# Patient Record
Sex: FEMALE | Race: WHITE | NOT HISPANIC OR LATINO | Employment: OTHER | URBAN - NONMETROPOLITAN AREA
[De-identification: names, ages, dates, MRNs, and addresses within clinical notes are randomized per-mention and may not be internally consistent; named-entity substitution may affect disease eponyms.]

---

## 2017-03-09 ENCOUNTER — OFFICE VISIT (OUTPATIENT)
Dept: INTERNAL MEDICINE | Facility: CLINIC | Age: 52
End: 2017-03-09

## 2017-03-09 VITALS
HEART RATE: 86 BPM | OXYGEN SATURATION: 98 % | DIASTOLIC BLOOD PRESSURE: 60 MMHG | BODY MASS INDEX: 21.24 KG/M2 | WEIGHT: 132.19 LBS | RESPIRATION RATE: 16 BRPM | HEIGHT: 66 IN | TEMPERATURE: 98.4 F | SYSTOLIC BLOOD PRESSURE: 100 MMHG

## 2017-03-09 DIAGNOSIS — J30.9 ALLERGIC RHINITIS, UNSPECIFIED ALLERGIC RHINITIS TRIGGER, UNSPECIFIED RHINITIS SEASONALITY: Primary | ICD-10-CM

## 2017-03-09 LAB
EXPIRATION DATE: NORMAL
FLUAV AG NPH QL: NORMAL
FLUBV AG NPH QL: NORMAL
INTERNAL CONTROL: NORMAL
Lab: NORMAL

## 2017-03-09 PROCEDURE — 99213 OFFICE O/P EST LOW 20 MIN: CPT | Performed by: NURSE PRACTITIONER

## 2017-03-09 PROCEDURE — 87804 INFLUENZA ASSAY W/OPTIC: CPT | Performed by: NURSE PRACTITIONER

## 2017-03-09 RX ORDER — IBUPROFEN 200 MG
200 TABLET ORAL EVERY 6 HOURS PRN
COMMUNITY
End: 2017-05-31

## 2017-03-09 RX ORDER — FLUTICASONE PROPIONATE 50 MCG
2 SPRAY, SUSPENSION (ML) NASAL DAILY
COMMUNITY
End: 2017-06-13

## 2017-03-09 RX ORDER — IVERMECTIN 10 MG/G
1 CREAM TOPICAL TAKE AS DIRECTED
COMMUNITY
Start: 2017-01-18 | End: 2019-05-24

## 2017-03-09 NOTE — PROGRESS NOTES
Chief Complaint / Reason:      Chief Complaint   Patient presents with   • Nasal Congestion     and runny nose back and forth. ? fever. Thinks she may have had an allergy attack. Onset of sx  night.    • Cough       Subjective   Patient states that has runny nose and cough. 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 She has environmental allergies and has been taking Aleve sinus/headache.  She appears in no distress but does have a clear runny nose. She is getting ready to go to [place] to visit friends.  Cough   This is a new problem. The current episode started in the past 7 days. The problem has been unchanged. The cough is non-productive. Associated symptoms include chest pain, chills, ear congestion, eye redness, nasal congestion, postnasal drip, rhinorrhea, a sore throat and sweats. The symptoms are aggravated by pollens, dust and lying down. She has tried body position changes for the symptoms. The treatment provided no relief. Her past medical history is significant for environmental allergies.       History taken from: patient    PMH/FH/Social History were reviewed and updated appropriately in the electronic medical record.     Review of Systems:   Review of Systems   Constitutional: Positive for chills.   HENT: Positive for postnasal drip, rhinorrhea, sneezing, sore throat and voice change.    Eyes: Positive for redness.   Respiratory: Positive for cough.    Cardiovascular: Positive for chest pain.   Gastrointestinal: Negative.    Musculoskeletal: Negative.    Allergic/Immunologic: Positive for environmental allergies.     All other systems were reviewed and are negative.  Exceptions are noted in the subjective or above.      Objective     Vital Signs  Temp:  [98.4 °F (36.9 °C)] 98.4 °F (36.9 °C)  Heart Rate:  [86] 86  Resp:  [16] 16  BP: (100)/(60) 100/60  Body mass index is  21.34 kg/(m^2).    Physical Exam   Constitutional: She is oriented to person, place, and time. She appears well-developed and well-nourished.   HENT:   Head: Normocephalic and atraumatic.       Mouth/Throat: Mucous membranes are dry. Posterior oropharyngeal erythema present.   Eyes: Lids are normal. Pupils are equal, round, and reactive to light. Right eye exhibits no discharge. Left eye exhibits no discharge.   Clear watery erythematous eyes noted.   Cardiovascular: Normal rate, regular rhythm, normal heart sounds and intact distal pulses.    Pulmonary/Chest: Effort normal and breath sounds normal. She exhibits tenderness.   Abdominal: Soft. Bowel sounds are normal.   Lymphadenopathy:     She has cervical adenopathy.   Neurological: She is alert and oriented to person, place, and time.   Skin: Skin is warm and dry.   Vitals reviewed.       Results Review:    I reviewed the patient's new clinical results. Flu -negative      Medication Review:     Current Outpatient Prescriptions:   •  aspirin 81 MG EC tablet, Take 81 mg by mouth Daily., Disp: , Rfl:   •  cholestyramine light (PREVALITE) 4 G packet, Take 1 packet by mouth Daily., Disp: 30 packet, Rfl: 5  •  fluticasone (FLONASE) 50 MCG/ACT nasal spray, 2 sprays into each nostril Daily., Disp: , Rfl:   •  ibuprofen (ADVIL,MOTRIN) 200 MG tablet, Take 200 mg by mouth Every 6 (Six) Hours As Needed for Mild Pain (1-3)., Disp: , Rfl:   •  loratadine-pseudoephedrine (CLARITIN-D 12 HOUR) 5-120 MG per 12 hr tablet, Take 1 tablet by mouth daily., Disp: , Rfl:   •  Multiple Vitamins-Minerals (MULTIVITAMIN ADULT PO), Take 1 tablet by mouth daily., Disp: , Rfl:   •  PROAIR  (90 BASE) MCG/ACT inhaler, , Disp: , Rfl:   •  Pseudoephedrine-Naproxen Na (ALEVE-D SINUS & HEADACHE PO), Take  by mouth., Disp: , Rfl:   •  SOOLANTRA 1 % cream, , Disp: , Rfl:     Assessment/Plan   Jeni was seen today for nasal congestion and cough.    Diagnoses and all orders for this  visit:    Allergic rhinitis, unspecified allergic rhinitis trigger, unspecified rhinitis seasonality    Treat symptomatically with increasing fluids, salt water gargles, Claritin, and Motrin.   Encourage good oral hygiene.   Do not recommend taking aspirin, motrin, and aleve sinus together due to previous colon surgery.   Wear Sunblock and hat when out in the sun.   Follow up as needed or if symptoms worsen.        Liya Bailey, APRN  03/09/17    *. Please note that portions of this note were completed with a voice recognition program. Efforts were made to edit the dictations, but occasionally words are mistranscribed.

## 2017-03-13 ENCOUNTER — TELEPHONE (OUTPATIENT)
Dept: INTERNAL MEDICINE | Facility: CLINIC | Age: 52
End: 2017-03-13

## 2017-03-13 RX ORDER — AMOXICILLIN AND CLAVULANATE POTASSIUM 875; 125 MG/1; MG/1
1 TABLET, FILM COATED ORAL 2 TIMES DAILY
Qty: 14 TABLET | Refills: 0 | Status: SHIPPED | OUTPATIENT
Start: 2017-03-13 | End: 2017-03-20

## 2017-03-13 NOTE — TELEPHONE ENCOUNTER
----- Message from Chelsea Landin sent at 3/13/2017  9:59 AM EDT -----  Contact: PATIENT  Patient called today stating that she saw Liya last Thursday and she was dx with allergic rhinitis, but was told if it developed any farther to call. Patient states that now it has transitioned to a sinus infection and needs something called in for this. States she will be flying on Wed and needs to start clearing it up before then. Patient can be reached at [phone]. Patient would like to know when this has been called in.    [facility]  [address], [place]   Ph. [phone]

## 2017-03-13 NOTE — TELEPHONE ENCOUNTER
p please let patient know that I tried calling her to discuss medications but that I could orders in for that [facility] she provided for us.  Tell her how she feels better enjoy her vacation thank

## 2017-05-31 ENCOUNTER — OFFICE VISIT (OUTPATIENT)
Dept: INTERNAL MEDICINE | Facility: CLINIC | Age: 52
End: 2017-05-31

## 2017-05-31 VITALS
WEIGHT: 135.13 LBS | TEMPERATURE: 98 F | DIASTOLIC BLOOD PRESSURE: 70 MMHG | HEART RATE: 77 BPM | OXYGEN SATURATION: 96 % | SYSTOLIC BLOOD PRESSURE: 115 MMHG | BODY MASS INDEX: 21.72 KG/M2 | HEIGHT: 66 IN

## 2017-05-31 DIAGNOSIS — K90.9 DIARRHEA DUE TO MALABSORPTION: Primary | ICD-10-CM

## 2017-05-31 DIAGNOSIS — R10.9 RIGHT FLANK PAIN: ICD-10-CM

## 2017-05-31 DIAGNOSIS — J30.1 SEASONAL ALLERGIC RHINITIS DUE TO POLLEN: ICD-10-CM

## 2017-05-31 DIAGNOSIS — R19.7 DIARRHEA DUE TO MALABSORPTION: Primary | ICD-10-CM

## 2017-05-31 LAB
ALBUMIN SERPL-MCNC: 4.4 G/DL (ref 3.5–5)
ALBUMIN/GLOB SERPL: 1.6 G/DL (ref 1–2)
ALP SERPL-CCNC: 79 U/L (ref 38–126)
ALT SERPL-CCNC: 29 U/L (ref 13–69)
AST SERPL-CCNC: 31 U/L (ref 15–46)
BILIRUB BLD-MCNC: NEGATIVE MG/DL
BILIRUB SERPL-MCNC: 0.5 MG/DL (ref 0.2–1.3)
BUN SERPL-MCNC: 16 MG/DL (ref 7–20)
BUN/CREAT SERPL: 22.9 (ref 7.1–23.5)
CALCIUM SERPL-MCNC: 9.6 MG/DL (ref 8.4–10.2)
CHLORIDE SERPL-SCNC: 102 MMOL/L (ref 98–107)
CLARITY, POC: CLEAR
CO2 SERPL-SCNC: 28 MMOL/L (ref 26–30)
COLOR UR: YELLOW
CREAT SERPL-MCNC: 0.7 MG/DL (ref 0.6–1.3)
ERYTHROCYTE [DISTWIDTH] IN BLOOD BY AUTOMATED COUNT: 13.2 % (ref 11.5–14.5)
GLOBULIN SER CALC-MCNC: 2.8 GM/DL
GLUCOSE SERPL-MCNC: 90 MG/DL (ref 74–98)
GLUCOSE UR STRIP-MCNC: NEGATIVE MG/DL
HCT VFR BLD AUTO: 40.9 % (ref 37–47)
HGB BLD-MCNC: 13 G/DL (ref 12–16)
KETONES UR QL: NEGATIVE
LEUKOCYTE EST, POC: NEGATIVE
MCH RBC QN AUTO: 29.8 PG (ref 27–31)
MCHC RBC AUTO-ENTMCNC: 31.8 G/DL (ref 30–37)
MCV RBC AUTO: 93.8 FL (ref 81–99)
NITRITE UR-MCNC: NEGATIVE MG/ML
PH UR: 5 [PH] (ref 5–8)
PLATELET # BLD AUTO: 164 10*3/MM3 (ref 130–400)
POTASSIUM SERPL-SCNC: 4.5 MMOL/L (ref 3.5–5.1)
PROT SERPL-MCNC: 7.2 G/DL (ref 6.3–8.2)
PROT UR STRIP-MCNC: NEGATIVE MG/DL
RBC # BLD AUTO: 4.36 10*6/MM3 (ref 4.2–5.4)
RBC # UR STRIP: ABNORMAL /UL
SODIUM SERPL-SCNC: 142 MMOL/L (ref 137–145)
SP GR UR: 1.02 (ref 1–1.03)
UROBILINOGEN UR QL: NORMAL
WBC # BLD AUTO: 5.14 10*3/MM3 (ref 4.8–10.8)

## 2017-05-31 PROCEDURE — 81003 URINALYSIS AUTO W/O SCOPE: CPT | Performed by: INTERNAL MEDICINE

## 2017-05-31 PROCEDURE — 99214 OFFICE O/P EST MOD 30 MIN: CPT | Performed by: INTERNAL MEDICINE

## 2017-06-06 ENCOUNTER — HOSPITAL ENCOUNTER (OUTPATIENT)
Dept: CT IMAGING | Facility: HOSPITAL | Age: 52
Discharge: HOME | End: 2017-06-06
Attending: INTERNAL MEDICINE | Admitting: INTERNAL MEDICINE

## 2017-06-06 ENCOUNTER — TRANSCRIBE ORDERS (OUTPATIENT)
Dept: MAMMOGRAPHY | Facility: HOSPITAL | Age: 52
End: 2017-06-06

## 2017-06-06 DIAGNOSIS — R10.9 RIGHT FLANK PAIN: ICD-10-CM

## 2017-06-06 DIAGNOSIS — Z12.31 VISIT FOR SCREENING MAMMOGRAM: Primary | ICD-10-CM

## 2017-06-06 DIAGNOSIS — K90.9 DIARRHEA DUE TO MALABSORPTION: ICD-10-CM

## 2017-06-06 DIAGNOSIS — R19.7 DIARRHEA DUE TO MALABSORPTION: ICD-10-CM

## 2017-06-06 PROCEDURE — 74177 CT ABD & PELVIS W/CONTRAST: CPT

## 2017-06-06 PROCEDURE — 0 IOPAMIDOL 61 % SOLUTION: Performed by: INTERNAL MEDICINE

## 2017-06-06 RX ADMIN — BARIUM SULFATE 450 ML: 21 SUSPENSION ORAL at 08:10

## 2017-06-06 RX ADMIN — IOPAMIDOL 95 ML: 612 INJECTION, SOLUTION INTRAVENOUS at 09:30

## 2017-06-07 DIAGNOSIS — R10.11 RIGHT UPPER QUADRANT ABDOMINAL PAIN: Primary | ICD-10-CM

## 2017-06-13 ENCOUNTER — OFFICE VISIT (OUTPATIENT)
Dept: SURGERY | Facility: CLINIC | Age: 52
End: 2017-06-13

## 2017-06-13 VITALS
OXYGEN SATURATION: 99 % | HEIGHT: 66 IN | TEMPERATURE: 97.9 F | BODY MASS INDEX: 22.21 KG/M2 | HEART RATE: 81 BPM | RESPIRATION RATE: 16 BRPM | SYSTOLIC BLOOD PRESSURE: 94 MMHG | DIASTOLIC BLOOD PRESSURE: 62 MMHG | WEIGHT: 138.2 LBS

## 2017-06-13 DIAGNOSIS — R10.11 RIGHT UPPER QUADRANT ABDOMINAL PAIN: Primary | ICD-10-CM

## 2017-06-13 PROCEDURE — 99204 OFFICE O/P NEW MOD 45 MIN: CPT | Performed by: SURGERY

## 2017-06-13 RX ORDER — MONTELUKAST SODIUM 4 MG/1
1 TABLET, CHEWABLE ORAL 2 TIMES DAILY
Qty: 60 TABLET | Refills: 1 | Status: SHIPPED | OUTPATIENT
Start: 2017-06-13 | End: 2017-07-13

## 2017-06-13 NOTE — PROGRESS NOTES
Subjective   Jeni Newby is a 52 y.o. female.   Chief Complaint   Patient presents with   • Abdominal Pain     RUQ pain        History of Present Illness   Ms Newby is a 52-year-old female patient referred for further evaluation of persistent right-sided abdominal pain following a robotic low anterior resection performed for unresectable villous adenoma of the left colon in September 2016.  This was performed by Dr. Melendez of [facility].  She reports chronic right-sided abdominal pain which has never gone away since her operative procedure.  She also reports right flank pain.  She has had associated constant diarrhea since that time and cholestyramine was recently added.  She admits that she has not been able to tolerate this as prescribed and has not been consistent in taking it.  She reports that her pain is exacerbated by touch and by lying on the left side.  She cannot identify any alleviating factors.  She was seen in follow up by Dr. Melendez and Dr. Huitron shortly after her procedure and reports that they did not have an explanation for her pain.  A recent CT was unrevealing as to the source of her pain.      Past Medical History:   Diagnosis Date   • Asthma     allergy induced   • Bronchitis    • Cancer     basal cell carcinoma   • Wears eyeglasses        Past Surgical History:   Procedure Laterality Date   • COLON RESECTION N/A 9/7/2016    Procedure: LOW ANTERIOR COLON RESECTION LAPAROSCOPIC  WITH DAVINCI SI ROBOT VS EXTENDED LEFT COLECTOMY WITH TAP BLOCK;  Surgeon: Isra Melendez MD;  Location:  [facility];  Service:    • COLONOSCOPY      2016   • PROCTOSCOPY N/A 9/7/2016    Procedure: PROCTOSCOPY RIGID;  Surgeon: Isra Melendez MD;  Location:  [facility];  Service:    • SKIN CANCER EXCISION     • WISDOM TOOTH EXTRACTION             Current Outpatient Prescriptions:   •  aspirin 81 MG EC tablet, Take 81 mg by mouth Daily., Disp: , Rfl:   •  loratadine-pseudoephedrine (CLARITIN-D 12 HOUR) 5-120 MG per 12 hr  tablet, Take 1 tablet by mouth daily., Disp: , Rfl:   •  Multiple Vitamins-Minerals (MULTIVITAMIN ADULT PO), Take 1 tablet by mouth daily., Disp: , Rfl:   •  PROAIR  (90 BASE) MCG/ACT inhaler, , Disp: , Rfl:   •  SOOLANTRA 1 % cream, , Disp: , Rfl:   •  TURMERIC PO, Take 1 tablet by mouth Daily., Disp: , Rfl:   •  colestipol (COLESTID) 1 G tablet, Take 1 tablet by mouth 2 (Two) Times a Day for 30 days., Disp: 60 tablet, Rfl: 1      Allergies   Allergen Reactions   • Corn-Containing Products      Cramps and diarrhea   • Mushroom Extract Complex Swelling     Throat swelling         Family History   Problem Relation Age of Onset   • Ovarian cancer Maternal Grandmother 64         Social History     Social History   • Marital status: Single     Spouse name: N/A   • Number of children: N/A   • Years of education: N/A     Occupational History   • Not on file.     Social History Main Topics   • Smoking status: Never Smoker   • Smokeless tobacco: Never Used   • Alcohol use No   • Drug use: No   • Sexual activity: Not on file     Other Topics Concern   • Not on file     Social History Narrative           Review of Systems   Constitutional: Negative for chills, fever and unexpected weight change.   HENT: Negative for hearing loss, trouble swallowing and voice change.    Eyes: Negative for visual disturbance.   Respiratory: Negative for apnea, cough, chest tightness, shortness of breath and wheezing.    Cardiovascular: Negative for chest pain, palpitations and leg swelling.   Gastrointestinal: Positive for abdominal pain. Negative for abdominal distention, anal bleeding, blood in stool, constipation, diarrhea, nausea, rectal pain and vomiting.   Endocrine: Negative for cold intolerance and heat intolerance.   Genitourinary: Negative for difficulty urinating, dysuria and flank pain.   Musculoskeletal: Negative for back pain and gait problem.   Skin: Negative for color change, rash and wound.   Neurological: Negative for  "dizziness, syncope, speech difficulty, weakness, light-headedness, numbness and headaches.   Hematological: Negative for adenopathy. Does not bruise/bleed easily.   Psychiatric/Behavioral: Negative for confusion. The patient is not nervous/anxious.        Objective    BP 94/62  Pulse 81  Temp 97.9 °F (36.6 °C) (Temporal Artery )   Resp 16  Ht 66\" (167.6 cm)  Wt 138 lb 3.2 oz (62.7 kg)  SpO2 99%  BMI 22.31 kg/m2    Physical Exam   Constitutional: She is oriented to person, place, and time. She appears well-developed and well-nourished.   HENT:   Head: Normocephalic and atraumatic.   Eyes: No scleral icterus.   Neck: Neck supple.   Cardiovascular: Regular rhythm.    Pulmonary/Chest: Effort normal.   Abdominal: Soft. She exhibits no distension. There is no tenderness.   Well-healed robotic port sites noted.  There is pain on palpation midway between the right upper quadrant and right lower quadrant   Neurological: She is alert and oriented to person, place, and time.   Skin: Skin is warm and dry.   Psychiatric: She has a normal mood and affect. Her behavior is normal.     Imaging:     CT abd/pelvis dated 5/31 personally reviewed    EXAMINATION: CT ABDOMEN AND PELVIS W CONTRAST-       INDICATION: K90.9-Intestinal malabsorption, unspecified; R19.7-Diarrhea,  unspecified; R10.9-Unspecified abdominal pain.      TECHNIQUE: Multiple axial CT imaging was obtained of the abdomen and pelvis following the administration of intravenous and oral contrast.      The radiation dose reduction device was turned on for each scan per the ALARA (As Low as Reasonably Achievable) protocol.      COMPARISON: None.      FINDINGS: ABDOMEN: Lung bases are grossly clear with some atelectatic changes seen within the left lung base. There is elevation of the left hemidiaphragm. There is homogeneity of the liver. No stones in the gallbladder. The kidneys and adrenal glands are within normal limits. Spleen is unremarkable. There is stool " scattered throughout the colon. Some gaseous distention is seen within several loops of colon and small bowel in the left upper quadrant. There is a moderate amount of stool seen scattered throughout the colon. No abnormal wall thickening. The pancreas is homogeneous. No abdominal or retroperitoneal lymphadenopathy.      PELVIS: Some fluid-filled loops of both small bowel as well as distal colon are seen in the pelvis. Findings suggest clinical presentation of diarrhea. There is no pelvic adenopathy. Pelvic organs are unremarkable. The pelvic portion of the gastrointestinal tract are otherwise within normal limits. No abnormal wall thickening of the bowel. No free fluid. The bony structures reveal minimal degenerative changes seen within the spine and pelvis.      IMPRESSION:  No abnormal wall thickening of the bowel with stool seen scattered throughout the colon in the right flank. There is air and mild distention seen of several loops of both large and small bowel within the leftward aspect of the abdomen. Distal colon reveals fluid and stoolsuggesting clinical presentation of diarrhea. No evidence of obvious obstruction. No free air. No free fluid.    Assessment/Plan   Jeni was seen today for abdominal pain.    Diagnoses and all orders for this visit:    Right upper quadrant abdominal pain  -     US Gallbladder; Future    Other orders  -     colestipol (COLESTID) 1 G tablet; Take 1 tablet by mouth 2 (Two) Times a Day for 30 days.      A right upper quadrant ultrasound was performed in the office today to evaluate for underlying gallbladder disease as the source of the patient's persistent pain and diarrhea.  This was negative for gallstones.  There may have been a small amount of sludge.  We discussed this in detail.  I have suggested a one-month trial of Colestid 1 g tablets twice per day, every day.  This would be in place of the cholestyramine powder.  We discussed dietary management of any potential  gallbladder symptoms.  I will see her back in 1 month for reevaluation of her symptoms, sooner if symptoms worsen.    The patient's [facility] report was obtained, reviewed by me and scanned into the medical record.

## 2017-07-13 ENCOUNTER — OFFICE VISIT (OUTPATIENT)
Dept: INTERNAL MEDICINE | Facility: CLINIC | Age: 52
End: 2017-07-13

## 2017-07-13 VITALS
WEIGHT: 136 LBS | HEIGHT: 66 IN | HEART RATE: 78 BPM | DIASTOLIC BLOOD PRESSURE: 70 MMHG | BODY MASS INDEX: 21.86 KG/M2 | OXYGEN SATURATION: 98 % | SYSTOLIC BLOOD PRESSURE: 100 MMHG | TEMPERATURE: 98.6 F

## 2017-07-13 DIAGNOSIS — R10.11 RIGHT UPPER QUADRANT ABDOMINAL PAIN: ICD-10-CM

## 2017-07-13 DIAGNOSIS — Z00.00 ROUTINE GENERAL MEDICAL EXAMINATION AT A HEALTH CARE FACILITY: Primary | ICD-10-CM

## 2017-07-13 PROCEDURE — 99396 PREV VISIT EST AGE 40-64: CPT | Performed by: INTERNAL MEDICINE

## 2017-07-13 NOTE — PROGRESS NOTES
Chief Complaint   Patient presents with   • Annual Exam     Physical   • Earache     Left ear pain       Jeni Newby is a 52 y.o. female and is here for a comprehensive physical exam. The patient reports problems - rt uq pain, ear ache.     History:  LMP: No LMP recorded. Patient is not currently having periods (Reason: Premenopausal).  Menopause at 50 years  Last pap date: 2016  Abnormal pap? no  : 0  Para: 0    Do you take any herbs or supplements that were not prescribed by a doctor? yes  Are you taking calcium supplements? no  Are you taking aspirin daily? yes      Health Habits:  Dental Exam. up to date  Eye Exam. up to date  Exercise: 6 times/week.  Current exercise activities include: cardiovascular workout on exercise equipment and golf, tennis    Health Maintenance   Topic Date Due   • TDAP/TD VACCINES (1 - Tdap) 1983   • HEPATITIS C SCREENING  2016   • PAP SMEAR  2016   • INFLUENZA VACCINE  2017   • MAMMOGRAM  2018   • COLONOSCOPY  08/15/2026       PMH, PSH, SocHx, FamHx, Allergies, and Medications: Reviewed and updated in the Visit Navigator.     Allergies   Allergen Reactions   • Corn-Containing Products      Cramps and diarrhea   • Mushroom Extract Complex Swelling     Throat swelling     Past Medical History:   Diagnosis Date   • Asthma     allergy induced   • Bronchitis    • Cancer     basal cell carcinoma   • Wears eyeglasses      Past Surgical History:   Procedure Laterality Date   • COLON RESECTION N/A 2016    Procedure: LOW ANTERIOR COLON RESECTION LAPAROSCOPIC  WITH DAVINCI SI ROBOT VS EXTENDED LEFT COLECTOMY WITH TAP BLOCK;  Surgeon: Isra Melendez MD;  Location: [facility];  Service:    • COLONOSCOPY         • PROCTOSCOPY N/A 2016    Procedure: PROCTOSCOPY RIGID;  Surgeon: Isra Melendez MD;  Location:  [facility];  Service:    • SKIN CANCER EXCISION     • WISDOM TOOTH EXTRACTION       Social History     Social History   • Marital status:  "Single     Spouse name: N/A   • Number of children: N/A   • Years of education: N/A     Occupational History   • Not on file.     Social History Main Topics   • Smoking status: Never Smoker   • Smokeless tobacco: Never Used   • Alcohol use No   • Drug use: No   • Sexual activity: Not on file     Other Topics Concern   • Not on file     Social History Narrative     Family History   Problem Relation Age of Onset   • Ovarian cancer Maternal Grandmother 64       Review of Systems  Review of Systems   Constitutional: Negative.  Negative for activity change, appetite change, fatigue and fever.   HENT: Positive for ear pain. Negative for congestion, ear discharge and trouble swallowing.    Eyes: Negative for photophobia and visual disturbance.   Respiratory: Negative for cough and shortness of breath.    Cardiovascular: Negative for chest pain and palpitations.   Gastrointestinal: Positive for abdominal pain. Negative for abdominal distention, constipation, diarrhea, nausea and vomiting.        Rt uq pain   Endocrine: Negative.    Genitourinary: Negative for dysuria, hematuria and urgency.   Musculoskeletal: Positive for arthralgias. Negative for back pain, joint swelling and myalgias.   Skin: Negative for color change and rash.   Allergic/Immunologic: Negative.    Neurological: Negative for dizziness, weakness, light-headedness and headaches.   Hematological: Negative for adenopathy. Does not bruise/bleed easily.   Psychiatric/Behavioral: Negative for agitation, confusion and dysphoric mood. The patient is not nervous/anxious.        Vitals:    07/13/17 1411   BP: 100/70   Pulse: 78   Temp: 98.6 °F (37 °C)   SpO2: 98%       Objective   /70  Pulse 78  Temp 98.6 °F (37 °C)  Ht 66\" (167.6 cm)  Wt 136 lb (61.7 kg)  SpO2 98%  BMI 21.95 kg/m2    Physical Exam   Constitutional: She is oriented to person, place, and time. She appears well-developed and well-nourished. No distress.   HENT:   Nose: Nose normal. "   Mouth/Throat: Oropharynx is clear and moist.   Eyes: Conjunctivae and EOM are normal. No scleral icterus.   Neck: No tracheal deviation present. No thyromegaly present.   Cardiovascular: Normal rate and regular rhythm.  Exam reveals no friction rub.    No murmur heard.  Pulmonary/Chest: No respiratory distress. She has no wheezes. She has no rales.   Abdominal: Soft. She exhibits no distension and no mass. There is no tenderness. There is no guarding.       tender   Musculoskeletal: Normal range of motion. She exhibits no deformity.   Lymphadenopathy:     She has no cervical adenopathy.   Neurological: She is alert and oriented to person, place, and time. She has normal reflexes. No cranial nerve deficit. Coordination normal.   Skin: Skin is warm and dry. No rash noted. No erythema.   Psychiatric: She has a normal mood and affect. Her behavior is normal. Judgment and thought content normal.        Assessment/Plan   1. Healthy female exam.    2. Patient Counseling: Including but not Limited to the following, when appropriate:  --Nutrition: Stressed importance of moderation in sodium/caffeine intake, saturated fat and cholesterol, caloric balance, sufficient intake of fresh fruits, vegetables, fiber, calcium,  --Discussed the issue of estrogen replacement, calcium supplement, and the daily use of baby aspirin.  --Exercise: Stressed the importance of regular exercise.   --Substance Abuse: Discussed cessation/primary prevention of tobacco, alcohol, or other drug use; driving or other dangerous activities under the influence; availability of treatment for abuse, as indicated based on social history.    --Sexuality: Discussed sexually transmitted diseases, partner selection, use of condoms, avoidance of unintended pregnancy  and contraceptive alternatives.   --Injury prevention: Discussed safety belts, safety helmets, smoke detector, smoking near bedding or upholstery.   --Dental health: Discussed importance of regular  tooth brushing, flossing, and dental visits.  --Immunizations reviewed.  --Discussed benefits of colon cancer screening.      3. Discussed the patient's BMI with her.  The BMI is in the acceptable range  4. No Follow-up on file.  5. Age-appropriate Screening Scheduled  6. There are no Patient Instructions on file for this visit.    Assessment/Plan     Jeni was seen today for annual exam and earache.    Diagnoses and all orders for this visit:    Routine general medical examination at a health care facility    Right upper quadrant abdominal pain. Ultrasound with evidence of possible gallbladder disease. We will coming from this with a hepatobiliary scan. Has appointment with general surgeon. Currently asymptomatic  -     NM HIDA scan with pharmacological intervention; Future

## 2017-07-20 ENCOUNTER — OFFICE VISIT (OUTPATIENT)
Dept: SURGERY | Facility: CLINIC | Age: 52
End: 2017-07-20

## 2017-07-20 VITALS
WEIGHT: 136 LBS | BODY MASS INDEX: 21.86 KG/M2 | HEART RATE: 85 BPM | OXYGEN SATURATION: 98 % | TEMPERATURE: 97.4 F | HEIGHT: 66 IN

## 2017-07-20 DIAGNOSIS — R10.33 PERIUMBILICAL ABDOMINAL PAIN: Primary | ICD-10-CM

## 2017-07-20 PROCEDURE — 99214 OFFICE O/P EST MOD 30 MIN: CPT | Performed by: SURGERY

## 2017-07-20 NOTE — PROGRESS NOTES
"Subjective   Jeni Newby is a 52 y.o. female.   Chief Complaint   Patient presents with   • Abdominal Pain       History of Present Illness   Mrs. Newby returns to the office today to discuss the possibility of cholecystectomy.  Since her last visit she was sent for a HIDA scan by Dr. Boogie and this demonstrated an ejection fraction of 66%, which is well within the normal range.  She does report that the HIDA scan made her \"feel funny.\"  She did not particularly have severe abdominal pain with the study.  She continues to have pain which she describes as being near her previous periumbilical laparoscopic port site which radiates to the back and is associated with nausea.  This is especially exacerbated by greasy food and dairy products.  Her diarrhea has dramatically improved on Colestid.      The following portions of the patient's history were reviewed and updated as appropriate: allergies, current medications, past family history, past medical history, past social history, past surgical history and problem list.    Review of Systems   Constitutional: Negative for chills, fever and unexpected weight change.   HENT: Negative for hearing loss, trouble swallowing and voice change.    Eyes: Negative for visual disturbance.   Respiratory: Negative for apnea, cough, chest tightness, shortness of breath and wheezing.    Cardiovascular: Negative for chest pain, palpitations and leg swelling.   Gastrointestinal: Positive for abdominal pain and nausea. Negative for abdominal distention, anal bleeding, blood in stool, constipation, diarrhea, rectal pain and vomiting.   Endocrine: Negative for cold intolerance and heat intolerance.   Genitourinary: Negative for difficulty urinating, dysuria and flank pain.   Musculoskeletal: Positive for back pain. Negative for gait problem.   Skin: Negative for color change, rash and wound.   Neurological: Negative for dizziness, syncope, speech difficulty, weakness, " "light-headedness, numbness and headaches.   Hematological: Negative for adenopathy. Does not bruise/bleed easily.   Psychiatric/Behavioral: Negative for confusion. The patient is not nervous/anxious.        Objective    Pulse 85  Temp 97.4 °F (36.3 °C)  Ht 66\" (167.6 cm)  Wt 136 lb (61.7 kg)  SpO2 98%  BMI 21.95 kg/m2    Physical Exam   Constitutional: She is oriented to person, place, and time. She appears well-developed and well-nourished.   HENT:   Head: Normocephalic and atraumatic.   Eyes: No scleral icterus.   Neck: Neck supple.   Cardiovascular: Regular rhythm.    Pulmonary/Chest: Effort normal.   Abdominal: Soft. She exhibits no distension. There is tenderness.   Point tenderness over the previous laparoscopic port site just to the right lateral side of the umbilicus.  Also with right upper quadrant tenderness to palpation.   Neurological: She is alert and oriented to person, place, and time.   Skin: Skin is warm and dry.   Psychiatric: She has a normal mood and affect. Her behavior is normal.     Imaging:    Outside HIDA scan report personally reviewed.  Images not available.      Assessment/Plan   Jeni was seen today for abdominal pain.    Diagnoses and all orders for this visit:    Periumbilical abdominal pain      I had a detailed discussion with the patient today in the office regarding options for further management.  We reviewed all of her prior studies and depth including her ultrasound performed at the last visit and her recent HIDA scan.  I have advised her that her ultrasound did show what appears to be sludge as well as a small gallbladder polyp versus a nonmobile stone.  In many cases, with the suggestion of biliary colic, it would be standard care to proceed with cholecystectomy for further management.  The patient and I discussed that her to scan is in the normal range, however, this does not have any bearing on underlying gallstone disease.  We discussed the surgical procedure in detail " we discussed its risks, benefits, and alternatives.  We also had a detailed discussion regarding the possibility of ongoing symptoms following cholecystectomy.  The patient is not willing to accept that as a consequence and as such does not wish to proceed with surgery unless there is certainty that her symptoms will improve.  Given that, I have recommended that she initiate elimination diet beginning with dairy and track her symptoms.  I have advised her that if she wishes to avoid cholecystectomy, she will ultimately need ultrasound follow-up in a year to reassess her gallbladder polyp.  She verbalized understanding of this.  She stated that she wished to consider her options and will contact this office when she had made a final decision.    Total time spent on this visit was 25 minutes, greater than 90% of which was spent in counseling.

## 2017-08-02 ENCOUNTER — TRANSCRIBE ORDERS (OUTPATIENT)
Dept: ADMINISTRATIVE | Facility: HOSPITAL | Age: 52
End: 2017-08-02

## 2017-08-02 DIAGNOSIS — Z12.31 VISIT FOR SCREENING MAMMOGRAM: Primary | ICD-10-CM

## 2017-08-03 ENCOUNTER — APPOINTMENT (OUTPATIENT)
Dept: MAMMOGRAPHY | Facility: HOSPITAL | Age: 52
End: 2017-08-03
Attending: OBSTETRICS & GYNECOLOGY

## 2017-08-08 ENCOUNTER — HOSPITAL ENCOUNTER (OUTPATIENT)
Dept: MAMMOGRAPHY | Facility: HOSPITAL | Age: 52
Discharge: HOME | End: 2017-08-08
Attending: OBSTETRICS & GYNECOLOGY | Admitting: OBSTETRICS & GYNECOLOGY

## 2017-08-08 DIAGNOSIS — Z12.31 VISIT FOR SCREENING MAMMOGRAM: ICD-10-CM

## 2017-08-08 PROCEDURE — 77067 SCR MAMMO BI INCL CAD: CPT | Performed by: RADIOLOGY

## 2017-08-08 PROCEDURE — 77063 BREAST TOMOSYNTHESIS BI: CPT | Performed by: RADIOLOGY

## 2017-08-08 PROCEDURE — 77063 BREAST TOMOSYNTHESIS BI: CPT

## 2017-08-08 PROCEDURE — G0202 SCR MAMMO BI INCL CAD: HCPCS

## 2017-10-10 RX ORDER — MONTELUKAST SODIUM 4 MG/1
1 TABLET, CHEWABLE ORAL 2 TIMES DAILY
Qty: 60 TABLET | Refills: 3 | Status: SHIPPED | OUTPATIENT
Start: 2017-10-10 | End: 2017-11-09

## 2017-10-16 ENCOUNTER — OFFICE VISIT (OUTPATIENT)
Dept: INTERNAL MEDICINE | Facility: CLINIC | Age: 52
End: 2017-10-16

## 2017-10-16 VITALS
HEIGHT: 66 IN | SYSTOLIC BLOOD PRESSURE: 110 MMHG | HEART RATE: 86 BPM | DIASTOLIC BLOOD PRESSURE: 75 MMHG | TEMPERATURE: 98 F | BODY MASS INDEX: 21.69 KG/M2 | OXYGEN SATURATION: 96 % | WEIGHT: 135 LBS

## 2017-10-16 DIAGNOSIS — K63.5 POLYP OF DESCENDING COLON, UNSPECIFIED TYPE: ICD-10-CM

## 2017-10-16 DIAGNOSIS — M79.601 PAIN OF RIGHT UPPER EXTREMITY: ICD-10-CM

## 2017-10-16 DIAGNOSIS — M79.672 FOOT PAIN, LEFT: Primary | ICD-10-CM

## 2017-10-16 PROCEDURE — 99214 OFFICE O/P EST MOD 30 MIN: CPT | Performed by: INTERNAL MEDICINE

## 2017-10-16 NOTE — PROGRESS NOTES
"Subjective  Jeni Newby is a 52 y.o. female    HPI coming in for evaluation has been complaining of left foot discomfort especially in her toes has been doing a lot of walking lately symptoms seemed to have started a few weeks ago after she walked excessively while trying to see her  completed triathlon. She denies direct trauma has used NSAIDs on a when necessary basis. Uses appropriate shoes.  Has noticed a lump in her right axillary region without associated pain no new trauma    The following portions of the patient's history were reviewed and updated as appropriate: allergies, current medications, past family history, past medical history, past social history, past surgical history, and problem list.     Review of Systems   Constitutional: Negative.  Negative for activity change, appetite change, fatigue and fever.   HENT: Negative for congestion, ear discharge, ear pain and trouble swallowing.    Eyes: Negative for photophobia and visual disturbance.   Respiratory: Negative for cough and shortness of breath.    Cardiovascular: Negative for chest pain and palpitations.   Gastrointestinal: Negative for abdominal distention, abdominal pain, constipation, diarrhea, nausea and vomiting.   Endocrine: Negative.    Genitourinary: Negative for dysuria, hematuria and urgency.   Musculoskeletal: Positive for arthralgias. Negative for back pain, joint swelling and myalgias.   Skin: Negative for color change and rash.   Allergic/Immunologic: Negative.    Neurological: Negative for dizziness, weakness, light-headedness and headaches.   Hematological: Negative for adenopathy. Does not bruise/bleed easily.   Psychiatric/Behavioral: Negative for agitation, confusion and dysphoric mood. The patient is not nervous/anxious.        Visit Vitals   • /75   • Pulse 86   • Temp 98 °F (36.7 °C)   • Ht 66\" (167.6 cm)   • Wt 135 lb (61.2 kg)   • LMP 07/01/2015   • SpO2 96%   • BMI 21.79 kg/m2       Objective  Physical " Exam   Constitutional: She is oriented to person, place, and time. She appears well-developed and well-nourished. No distress.   HENT:   Nose: Nose normal.   Mouth/Throat: Oropharynx is clear and moist.   Eyes: Conjunctivae and EOM are normal. No scleral icterus.   Neck: No tracheal deviation present. No thyromegaly present.   Cardiovascular: Normal rate and regular rhythm.  Exam reveals no friction rub.    No murmur heard.  Pulmonary/Chest: No respiratory distress. She has no wheezes. She has no rales.   Abdominal: Soft. She exhibits no distension and no mass. There is no tenderness. There is no guarding.   Musculoskeletal: Normal range of motion. She exhibits tenderness and deformity.   Lymphadenopathy:     She has no cervical adenopathy.   Neurological: She is alert and oriented to person, place, and time. She has normal reflexes. No cranial nerve deficit. Coordination normal.   Skin: Skin is warm and dry. No rash noted. No erythema.   Rt arm ecchymosis   Psychiatric: She has a normal mood and affect. Her behavior is normal. Judgment and thought content normal.       Diagnoses and all orders for this visit:    Foot pain, left suspect DJD advised appropriate footwear NSAIDs when necessary    Pain of right upper extremity exam without evidence of adenopathy appears to have resolved pain improving. Further workup if needed

## 2017-11-15 ENCOUNTER — OFFICE VISIT (OUTPATIENT)
Dept: GASTROENTEROLOGY | Facility: CLINIC | Age: 52
End: 2017-11-15

## 2017-11-15 ENCOUNTER — PREP FOR SURGERY (OUTPATIENT)
Dept: OTHER | Facility: HOSPITAL | Age: 52
End: 2017-11-15

## 2017-11-15 VITALS
TEMPERATURE: 98.2 F | HEART RATE: 86 BPM | RESPIRATION RATE: 16 BRPM | SYSTOLIC BLOOD PRESSURE: 104 MMHG | BODY MASS INDEX: 21.86 KG/M2 | DIASTOLIC BLOOD PRESSURE: 69 MMHG | WEIGHT: 136 LBS | HEIGHT: 66 IN

## 2017-11-15 DIAGNOSIS — R10.33 PERIUMBILICAL ABDOMINAL PAIN: ICD-10-CM

## 2017-11-15 DIAGNOSIS — K63.9 LESION OF COLON: ICD-10-CM

## 2017-11-15 DIAGNOSIS — K82.4 GALLBLADDER POLYP: ICD-10-CM

## 2017-11-15 DIAGNOSIS — R10.33 PERIUMBILICAL PAIN: ICD-10-CM

## 2017-11-15 DIAGNOSIS — Z12.11 COLON CANCER SCREENING: Primary | ICD-10-CM

## 2017-11-15 DIAGNOSIS — K83.8 DILATED CBD, ACQUIRED: ICD-10-CM

## 2017-11-15 PROCEDURE — 99244 OFF/OP CNSLTJ NEW/EST MOD 40: CPT | Performed by: INTERNAL MEDICINE

## 2017-11-15 RX ORDER — MONTELUKAST SODIUM 4 MG/1
1 TABLET, CHEWABLE ORAL DAILY PRN
COMMUNITY
End: 2018-02-22

## 2017-11-15 NOTE — PROGRESS NOTES
"Chief Complaint   Patient presents with   • Colon Cancer Screening       History of Present Illness     For colon cancer screening.  The patient has history of diarrhea off-and-on for the last 1 year or so.  Severity is mild, frequency of bowel movements being 2-3 times a day.  The stools are described as loose and occasionally watery.  Occasionally, there is no nocturnal element of diarrhea. The diarrhea is not associated with tenesmus.  Lately,  her diarrheal symptoms have improved.  There is history of periumbilical abdominal pain off and on for the last 1 year or so.  The pain is gradual in onset, mild-moderate in severity and aching in character.  Frequency being 2-3 times a week.  The pain may last for several minutes.  There is no radiation of abdominal pain.  Eating worsens the abdominal discomfort.  No definite relieving factors of abdominal pain.  Lately her abdominal pain has improved.  She denies nausea or vomiting.   There is no history of overt GI bleed (hematemesis melena or hematochezia).  There is no history of reflux.  The patient denies dysphagia or odynophagia.  There is no history of recent significant weight loss.  There is no history of liver or pancreatic disease.    The patient had undergone a colon cancer screening in [facility] in August 2016 when she was found to have colon polyps and \"carpet lesion in the sigmoid colon at 30 cm from anal verge\".  Later this was removed surgically by colorectal surgery lower anterior resection.  Interestingly the lesion measured only 1.5 x 1.3 x 1 cm.  Biopsies revealed villotubular adenoma.  No dysplastic change carcinoma in situ or malignancy was noted.  The patient had rather recovery and started having diarrhea as well as abdominal pain since then.  The patient was advised to undergo a follow-up colonoscopy in one year.    She has been seen by Delia Doss M.D. from surgical service who has performed an ultrasound right upper quadrant.  The " patient was found to have a small polyp within the gallbladder measuring 4 mm.  Furthermore she was found to have CBD that measured 7 mm.  Later the patient underwent [facility]CK HIDA scan which revealed gallbladder ejection fraction of 66%.     Review of Systems   Constitutional: Negative for appetite change, chills, fatigue, fever and unexpected weight change.   HENT: Negative for mouth sores, nosebleeds and trouble swallowing.    Eyes: Negative for discharge and redness.   Respiratory: Positive for cough. Negative for apnea and shortness of breath.    Cardiovascular: Negative for chest pain, palpitations and leg swelling.   Gastrointestinal: Negative for abdominal distention, abdominal pain, anal bleeding, blood in stool, constipation, diarrhea, nausea and vomiting.   Endocrine: Negative for cold intolerance, heat intolerance and polydipsia.   Genitourinary: Negative for dysuria, hematuria and urgency.   Musculoskeletal: Negative for arthralgias, joint swelling and myalgias.   Skin: Negative for rash.   Allergic/Immunologic: Positive for environmental allergies and food allergies. Negative for immunocompromised state.   Neurological: Negative for dizziness, seizures, syncope and headaches.   Hematological: Negative for adenopathy. Bruises/bleeds easily.   Psychiatric/Behavioral: Negative for dysphoric mood. The patient is not nervous/anxious and is not hyperactive.      Patient Active Problem List   Diagnosis   • Routine general medical examination at a health care facility   • Neoplasm of abdomen     Past Medical History:   Diagnosis Date   • Asthma     allergy induced   • Bronchitis    • Cancer     basal cell carcinoma   • Wears eyeglasses      Past Surgical History:   Procedure Laterality Date   • COLON RESECTION N/A 9/7/2016    Procedure: LOW ANTERIOR COLON RESECTION LAPAROSCOPIC  WITH DAVINCI SI ROBOT VS EXTENDED LEFT COLECTOMY WITH TAP BLOCK;  Surgeon: Isra Melendez MD;  Location: [facility];  Service:    •  "COLONOSCOPY      2016   • PROCTOSCOPY N/A 9/7/2016    Procedure: PROCTOSCOPY RIGID;  Surgeon: Isra Melendez MD;  Location: [facility];  Service:    • SKIN CANCER EXCISION     • WISDOM TOOTH EXTRACTION       Family History   Problem Relation Age of Onset   • Ovarian cancer Maternal Grandmother 64     Social History   Substance Use Topics   • Smoking status: Never Smoker   • Smokeless tobacco: Never Used   • Alcohol use No       Current Outpatient Prescriptions:   •  aspirin 81 MG EC tablet, Take 81 mg by mouth Daily., Disp: , Rfl:   •  colestipol (COLESTID) 1 g tablet, Take 1 g by mouth Daily., Disp: , Rfl:   •  FLUARIX QUADRIVALENT 0.5 ML suspension prefilled syringe injection, , Disp: , Rfl:   •  loratadine-pseudoephedrine (CLARITIN-D 12 HOUR) 5-120 MG per 12 hr tablet, Take 1 tablet by mouth daily., Disp: , Rfl:   •  Multiple Vitamins-Minerals (HAIR SKIN AND NAILS FORMULA PO), Take  by mouth., Disp: , Rfl:   •  Multiple Vitamins-Minerals (MULTIVITAMIN ADULT PO), Take 1 tablet by mouth daily., Disp: , Rfl:   •  PROAIR  (90 BASE) MCG/ACT inhaler, , Disp: , Rfl:   •  SOOLANTRA 1 % cream, , Disp: , Rfl:   •  TURMERIC PO, Take 1 tablet by mouth Daily., Disp: , Rfl:   •  ZOSTAVAX 39760 UNT/0.65ML reconstituted suspension, , Disp: , Rfl:     Allergies   Allergen Reactions   • Corn-Containing Products      Cramps and diarrhea   • Mushroom Extract Complex Swelling     Throat swelling       Blood pressure 104/69, pulse 86, temperature 98.2 °F (36.8 °C), resp. rate 16, height 66\" (167.6 cm), weight 136 lb (61.7 kg), last menstrual period 07/01/2015.    Physical Exam   Constitutional: She is oriented to person, place, and time. She appears well-developed and well-nourished. No distress.   HENT:   Head: Normocephalic and atraumatic.   Right Ear: Hearing and external ear normal.   Left Ear: Hearing and external ear normal.   Nose: Nose normal.   Mouth/Throat: Oropharynx is clear and moist and mucous membranes are normal. " Mucous membranes are not pale, not dry and not cyanotic. No oral lesions. No oropharyngeal exudate.   Eyes: Conjunctivae and EOM are normal. Right eye exhibits no discharge. Left eye exhibits no discharge. No scleral icterus.   Neck: Trachea normal. Neck supple. No JVD present. No edema present. No thyroid mass and no thyromegaly present.   Cardiovascular: Normal rate, regular rhythm, S2 normal and normal heart sounds.  Exam reveals no gallop, no S3 and no friction rub.    No murmur heard.  Pulmonary/Chest: Effort normal and breath sounds normal. No respiratory distress. She has no wheezes. She has no rales. She exhibits no tenderness.   Abdominal: Soft. Normal appearance and bowel sounds are normal. She exhibits no distension, no ascites and no mass. There is no splenomegaly or hepatomegaly. There is no tenderness. There is no rigidity, no rebound and no guarding. No hernia.   Musculoskeletal: She exhibits no tenderness or deformity.       Vascular Status -  Her exam exhibits no right foot edema. Her exam exhibits no left foot edema.  Lymphadenopathy:     She has no cervical adenopathy.        Left: No supraclavicular adenopathy present.   Neurological: She is alert and oriented to person, place, and time. She has normal strength. No cranial nerve deficit or sensory deficit. She exhibits normal muscle tone. Coordination normal.   Skin: No rash noted. She is not diaphoretic. No cyanosis. No pallor. Nails show no clubbing.   Psychiatric: She has a normal mood and affect. Her behavior is normal. Judgment and thought content normal.   Nursing note and vitals reviewed.    Stigmata of chronic liver disease:  None.  Asterixis:  None.      Laboratory Testing:  Upon review of medical records:    Dated May 31, 2017 sodium 142 potassium 4.5 chloride 102 CO2 28 BUN 16 serum creatinine 0.70 glucose 90.  Calcium 9.6.  Albumin 4.40.  T bili 0.5 AST 31 ALT 29 alkaline phosphatase 79.  Total protein 7.2.  WBC 5.14 hemoglobin 13.0  hematocrit 40.9 MCV 93.8 and platelet count 164.     Abdominal imaging:   Upon review of medical records:     Dated June 6, 2017 the patient underwent a CT of the abdomen and pelvis with oral and IV contrast which revealed: Lung bases are grossly clear with some atelectatic changes seen within the left lung base.  There is elevation of the left hemidiaphragm.  Homogeneity of the liver.  No stones in the gallbladder.  Kidneys and adrenal glands are within normal limits.  Spleen is unremarkable.  Stool scattered throughout the colon.  Some gaseous distention is seen within several loops of colon and small bowel in the left upper quadrant.  Moderate amount of stool seen scattered throughout the colon.  No abnormal wall thickening.  Pancreas is homogeneous.  No abdominal retroperitoneal lymphadenopathy.  Some fluid-filled loops of both small bowel as well as distal colon are seen in the pelvis.  Findings suggest clinical presentation of diarrhea.  No pelvic adenopathy.  Pelvic organs are unremarkable.  Pelvic portion of the GI tract are otherwise within normal limits.  No abnormal wall thickening of the bowel.  No free fluid.  The bony structures reveal minimal degenerative changes seen within the spine and pelvis.    Dated June 13, 2017 the patient underwent a right upper quadrant/gallbladder ultrasound which revealed: Gallbladder appears somewhat contracted.  Gallbladder wall is slightly thickened and 7 mm.  No findings to suggest pericholecystic fluid or edema.  No shadowing stones identified within the gallbladder however, there is a small polyp measuring 4 mm.  No intrahepatic biliary ductal dilatation.  The common bile duct measures 7 mm in diameter which is slightly above the upper limits of normal given the patient’s age.    Dated July 17, 2017 the patient underwent a [facility] HIDA scan in [facility] which revealed: Normal hepatobiliary scan with normal ejection fraction of 66%.    Procedure:  Upon review of  medical records:     Dated August 15, 2016 the patient underwent a colonoscopy by Dr. Melendez which revealed: Large polyp at 30 cm.  This has a globular component with central dimpling.  It also seems to be growing like a  carpet out over the mucosa as well.  The bulk of this was removed with hot snare polypectomy.  Dina ink was used to marianne the area.  Otherwise, the patient had a normal colon to the cecum with the exception of a long and redundant colon.  Internal hemorrhoids, grade 1.  Sphincter tone, normal.    Assessment and Plan:      Jeni was seen today for colon cancer screening.    Diagnoses and all orders for this visit:    Colon cancer screening    Lesion of colon  Comments:  Tubulovillous adenoma in the sigmoid colon.  This was removed surgically by low anterior resection.  The lesion interestingly measured only1.5 x 1.3 x 1.0 cm.     Dilated cbd, acquired  Comments:  Measuring 7 mm in the presence of gallbladder.  Of interest LFTs normal.    Periumbilical abdominal pain  Comments:  Likely secondary to underlying adhesions.    Gallbladder polyp  Comments:  Small 4 mm gallbladder polyp.              Plan  and Patient Instructions:    Patient Instructions   1. Colonoscopy: Description of the procedure, risks benefits alternatives and options including non-operative options were discussed with the patient in detail.  The patient understands and wishes to proceed.  2. Of interest the patient was noted to have dilated CBD at 7 mm in the presence of gallbladder.  Of further note her LFTs have been normal.  It may be prudent to perform an MRCP to evaluate the common bile duct.  3. It is recommended that the patient should undergo a follow-up ultrasound perhaps in a year or so to follow-up on gallbladder polyp.  4. Discussed with the patient in detail.  Opportunity was given to ask questions.        Tino Stapleton MD

## 2017-11-15 NOTE — PATIENT INSTRUCTIONS
1. Colonoscopy: Description of the procedure, risks benefits alternatives and options including non-operative options were discussed with the patient in detail.  The patient understands and wishes to proceed.  2. Of interest the patient was noted to have dilated CBD at 7 mm in the presence of gallbladder.  Of further note her LFTs have been normal.  It may be prudent to perform an MRCP to evaluate the common bile duct.  3. It is recommended that the patient should undergo a follow-up ultrasound perhaps in a year or so to follow-up on gallbladder polyp.  4. Discussed with the patient in detail.  Opportunity was given to ask questions.

## 2017-11-27 ENCOUNTER — OFFICE VISIT (OUTPATIENT)
Dept: INTERNAL MEDICINE | Facility: CLINIC | Age: 52
End: 2017-11-27

## 2017-11-27 VITALS
HEART RATE: 72 BPM | BODY MASS INDEX: 21.53 KG/M2 | WEIGHT: 134 LBS | OXYGEN SATURATION: 94 % | SYSTOLIC BLOOD PRESSURE: 110 MMHG | DIASTOLIC BLOOD PRESSURE: 70 MMHG | TEMPERATURE: 98.2 F | HEIGHT: 66 IN

## 2017-11-27 DIAGNOSIS — J40 BRONCHITIS: Primary | ICD-10-CM

## 2017-11-27 PROCEDURE — 99213 OFFICE O/P EST LOW 20 MIN: CPT | Performed by: INTERNAL MEDICINE

## 2017-11-27 RX ORDER — DOXYCYCLINE HYCLATE 100 MG/1
100 TABLET, DELAYED RELEASE ORAL 2 TIMES DAILY
Qty: 14 TABLET | Refills: 0 | Status: SHIPPED | OUTPATIENT
Start: 2017-11-27 | End: 2017-12-04

## 2017-11-27 NOTE — PROGRESS NOTES
"Subjective  Jeni Newby is a 52 y.o. female    HPI coming in with complains of cough for about a week without associated fever or chills complains of nasal congestion and postnasal drainage    The following portions of the patient's history were reviewed and updated as appropriate: allergies, current medications, past family history, past medical history, past social history, past surgical history, and problem list.     Review of Systems   Constitutional: Negative.  Negative for activity change, appetite change, fatigue and fever.   HENT: Negative for congestion, ear discharge, ear pain and trouble swallowing.    Eyes: Negative for photophobia and visual disturbance.   Respiratory: Positive for cough. Negative for shortness of breath.    Cardiovascular: Negative for chest pain and palpitations.   Gastrointestinal: Negative for abdominal distention, abdominal pain, constipation, diarrhea, nausea and vomiting.   Endocrine: Negative.    Genitourinary: Negative for dysuria, hematuria and urgency.   Musculoskeletal: Positive for arthralgias. Negative for back pain, joint swelling and myalgias.   Skin: Negative for color change and rash.   Allergic/Immunologic: Negative.    Neurological: Negative for dizziness, weakness, light-headedness and headaches.   Hematological: Negative for adenopathy. Does not bruise/bleed easily.   Psychiatric/Behavioral: Negative for agitation, confusion and dysphoric mood. The patient is not nervous/anxious.        Visit Vitals   • /70   • Pulse 72   • Temp 98.2 °F (36.8 °C)   • Ht 66\" (167.6 cm)   • Wt 134 lb (60.8 kg)   • LMP 07/01/2015   • SpO2 94%   • BMI 21.63 kg/m2       Objective  Physical Exam   Constitutional: She is oriented to person, place, and time. She appears well-developed and well-nourished. No distress.   HENT:   Nose: Nose normal.   Mouth/Throat: Oropharynx is clear and moist.   Eyes: Conjunctivae and EOM are normal. No scleral icterus.   Neck: No tracheal " deviation present. No thyromegaly present.   Cardiovascular: Normal rate and regular rhythm.  Exam reveals no friction rub.    No murmur heard.  Pulmonary/Chest: No respiratory distress. She has no wheezes. She has no rales.   Abdominal: Soft. She exhibits no distension and no mass. There is no tenderness. There is no guarding.   Musculoskeletal: Normal range of motion. She exhibits no deformity.   Lymphadenopathy:     She has no cervical adenopathy.   Neurological: She is alert and oriented to person, place, and time. She has normal reflexes. No cranial nerve deficit. Coordination normal.   Skin: Skin is warm and dry. No rash noted. No erythema.   Psychiatric: She has a normal mood and affect. Her behavior is normal. Judgment and thought content normal.       Diagnoses and all orders for this visit:    Bronchitis empiric antibiotic therapy with doxycycline. Advise warm water with salt gargles. She has a history of asthmatic bronchitis we will place her on Asmanex for now    Other orders  -     doxycycline (DORYX) 100 MG enteric coated tablet; Take 1 tablet by mouth 2 (Two) Times a Day.

## 2017-12-04 ENCOUNTER — OFFICE VISIT (OUTPATIENT)
Dept: INTERNAL MEDICINE | Facility: CLINIC | Age: 52
End: 2017-12-04

## 2017-12-04 ENCOUNTER — HOSPITAL ENCOUNTER (OUTPATIENT)
Dept: GENERAL RADIOLOGY | Facility: HOSPITAL | Age: 52
Discharge: HOME | End: 2017-12-04
Attending: INTERNAL MEDICINE | Admitting: INTERNAL MEDICINE

## 2017-12-04 VITALS
WEIGHT: 133 LBS | HEART RATE: 85 BPM | HEIGHT: 66 IN | BODY MASS INDEX: 21.38 KG/M2 | SYSTOLIC BLOOD PRESSURE: 100 MMHG | DIASTOLIC BLOOD PRESSURE: 70 MMHG | OXYGEN SATURATION: 94 % | TEMPERATURE: 96 F

## 2017-12-04 DIAGNOSIS — R07.81 RIB PAIN ON LEFT SIDE: Primary | ICD-10-CM

## 2017-12-04 DIAGNOSIS — R07.81 RIB PAIN ON LEFT SIDE: ICD-10-CM

## 2017-12-04 LAB
ALBUMIN SERPL-MCNC: 4.2 G/DL (ref 3.5–5)
ALBUMIN/GLOB SERPL: 1.2 G/DL (ref 1–2)
ALP SERPL-CCNC: 83 U/L (ref 38–126)
ALT SERPL-CCNC: 22 U/L (ref 13–69)
AST SERPL-CCNC: 20 U/L (ref 15–46)
BILIRUB SERPL-MCNC: 0.4 MG/DL (ref 0.2–1.3)
BUN SERPL-MCNC: 12 MG/DL (ref 7–20)
BUN/CREAT SERPL: 17.1 (ref 7.1–23.5)
CALCIUM SERPL-MCNC: 10.1 MG/DL (ref 8.4–10.2)
CHLORIDE SERPL-SCNC: 104 MMOL/L (ref 98–107)
CO2 SERPL-SCNC: 30 MMOL/L (ref 26–30)
CREAT SERPL-MCNC: 0.7 MG/DL (ref 0.6–1.3)
ERYTHROCYTE [DISTWIDTH] IN BLOOD BY AUTOMATED COUNT: 13.1 % (ref 11.5–14.5)
GFR SERPLBLD CREATININE-BSD FMLA CKD-EPI: 106 ML/MIN/1.73
GFR SERPLBLD CREATININE-BSD FMLA CKD-EPI: 88 ML/MIN/1.73
GLOBULIN SER CALC-MCNC: 3.4 GM/DL
GLUCOSE SERPL-MCNC: 86 MG/DL (ref 74–98)
HCT VFR BLD AUTO: 44.7 % (ref 37–47)
HGB BLD-MCNC: 14.3 G/DL (ref 12–16)
MCH RBC QN AUTO: 30 PG (ref 27–31)
MCHC RBC AUTO-ENTMCNC: 32 G/DL (ref 30–37)
MCV RBC AUTO: 93.7 FL (ref 81–99)
PLATELET # BLD AUTO: 203 10*3/MM3 (ref 130–400)
POTASSIUM SERPL-SCNC: 4.5 MMOL/L (ref 3.5–5.1)
PROT SERPL-MCNC: 7.6 G/DL (ref 6.3–8.2)
RBC # BLD AUTO: 4.77 10*6/MM3 (ref 4.2–5.4)
SODIUM SERPL-SCNC: 145 MMOL/L (ref 137–145)
WBC # BLD AUTO: 4.4 10*3/MM3 (ref 4.8–10.8)

## 2017-12-04 PROCEDURE — 71101 X-RAY EXAM UNILAT RIBS/CHEST: CPT

## 2017-12-04 PROCEDURE — 99213 OFFICE O/P EST LOW 20 MIN: CPT | Performed by: INTERNAL MEDICINE

## 2017-12-04 NOTE — PROGRESS NOTES
"Subjective  Jeni Newby is a 52 y.o. female    HPI recent history of upper respiratory infection for which she was treated with antibiotics. Some of her symptoms persist has a dry cough complains of pain in her left rib region anteriorly. Denies direct trauma. No hemoptysis no fever or chills nonsmoker    The following portions of the patient's history were reviewed and updated as appropriate: allergies, current medications, past family history, past medical history, past social history, past surgical history, and problem list.     Review of Systems   Constitutional: Positive for fatigue. Negative for activity change, appetite change and fever.   HENT: Negative for congestion, ear discharge, ear pain and trouble swallowing.    Eyes: Negative for photophobia and visual disturbance.   Respiratory: Positive for cough and shortness of breath.    Cardiovascular: Negative for chest pain and palpitations.   Gastrointestinal: Negative for abdominal distention, abdominal pain, constipation, diarrhea, nausea and vomiting.   Endocrine: Negative.    Genitourinary: Negative for dysuria, hematuria and urgency.   Musculoskeletal: Negative for arthralgias, back pain, joint swelling and myalgias.        Rib pain   Skin: Negative for color change and rash.   Allergic/Immunologic: Negative.    Neurological: Negative for dizziness, weakness, light-headedness and headaches.   Hematological: Negative for adenopathy. Does not bruise/bleed easily.   Psychiatric/Behavioral: Negative for agitation, confusion and dysphoric mood. The patient is not nervous/anxious.        Visit Vitals   • /70   • Pulse 85   • Temp 96 °F (35.6 °C)   • Ht 66\" (167.6 cm)   • Wt 133 lb (60.3 kg)   • LMP 07/01/2015   • SpO2 94%   • BMI 21.47 kg/m2       Objective  Physical Exam   Constitutional: She is oriented to person, place, and time. She appears well-developed and well-nourished. No distress.   HENT:   Nose: Nose normal.   Mouth/Throat: Oropharynx " is clear and moist.   Eyes: Conjunctivae and EOM are normal. No scleral icterus.   Neck: No tracheal deviation present. No thyromegaly present.   Cardiovascular: Normal rate and regular rhythm.  Exam reveals no friction rub.    No murmur heard.  Pulmonary/Chest: No respiratory distress. She has no wheezes. She has no rales.   Abdominal: Soft. She exhibits no distension and no mass. There is no tenderness. There is no guarding.   Musculoskeletal: Normal range of motion. She exhibits tenderness. She exhibits no deformity.   Lt sided rib pain   Lymphadenopathy:     She has no cervical adenopathy.   Neurological: She is alert and oriented to person, place, and time. She has normal reflexes. No cranial nerve deficit. Coordination normal.   Skin: Skin is warm and dry. No rash noted. No erythema.   Psychiatric: She has a normal mood and affect. Her behavior is normal. Judgment and thought content normal.       Diagnoses and all orders for this visit:    Rib pain on left side check x-ray continue with Tylenol for now check lab work in view of her complaints of fatigue. Continue with Phenergan with codeine at nighttime

## 2017-12-05 ENCOUNTER — TELEPHONE (OUTPATIENT)
Dept: INTERNAL MEDICINE | Facility: CLINIC | Age: 52
End: 2017-12-05

## 2017-12-05 NOTE — TELEPHONE ENCOUNTER
Patient notified regarding lab results.    Patient would like to know if Pneumonia is possible? she stated that she meant to ask you about this at her last appointment. She states that she will continue her current medications.

## 2017-12-06 RX ORDER — SODIUM CHLORIDE 9 MG/ML
70 INJECTION, SOLUTION INTRAVENOUS CONTINUOUS PRN
Status: CANCELLED | OUTPATIENT
Start: 2017-12-06

## 2017-12-07 PROBLEM — Z12.11 COLON CANCER SCREENING: Status: ACTIVE | Noted: 2017-12-07

## 2017-12-07 PROBLEM — K63.9 LESION OF COLON: Status: ACTIVE | Noted: 2017-12-07

## 2017-12-07 PROBLEM — R10.33 PERIUMBILICAL PAIN: Status: ACTIVE | Noted: 2017-12-07

## 2017-12-18 ENCOUNTER — TELEPHONE (OUTPATIENT)
Dept: GASTROENTEROLOGY | Facility: CLINIC | Age: 52
End: 2017-12-18

## 2017-12-18 NOTE — TELEPHONE ENCOUNTER
Patient states that Dr. Stapleton wanted her to stop Colestipol.  She has tried this and had an increase in diarrhea since doing so.  Per Dr. Stapleton's note the med has not been discontinued.  She will discuss this with Dr. Stapleton Procedure day 12/22/17.  Colon instructions discussed in detail.  She states understanding.

## 2017-12-22 ENCOUNTER — HOSPITAL ENCOUNTER (OUTPATIENT)
Facility: HOSPITAL | Age: 52
Setting detail: HOSPITAL OUTPATIENT SURGERY
Discharge: HOME | End: 2017-12-22
Attending: INTERNAL MEDICINE | Admitting: INTERNAL MEDICINE

## 2017-12-22 ENCOUNTER — ANESTHESIA (OUTPATIENT)
Dept: GASTROENTEROLOGY | Facility: HOSPITAL | Age: 52
End: 2017-12-22

## 2017-12-22 ENCOUNTER — ANESTHESIA EVENT (OUTPATIENT)
Dept: GASTROENTEROLOGY | Facility: HOSPITAL | Age: 52
End: 2017-12-22

## 2017-12-22 VITALS
SYSTOLIC BLOOD PRESSURE: 101 MMHG | WEIGHT: 132 LBS | DIASTOLIC BLOOD PRESSURE: 65 MMHG | BODY MASS INDEX: 21.21 KG/M2 | RESPIRATION RATE: 18 BRPM | HEIGHT: 66 IN | OXYGEN SATURATION: 99 % | TEMPERATURE: 97.6 F | HEART RATE: 70 BPM

## 2017-12-22 DIAGNOSIS — Z12.11 COLON CANCER SCREENING: ICD-10-CM

## 2017-12-22 DIAGNOSIS — R10.33 PERIUMBILICAL PAIN: ICD-10-CM

## 2017-12-22 DIAGNOSIS — K63.9 LESION OF COLON: ICD-10-CM

## 2017-12-22 PROCEDURE — S0260 H&P FOR SURGERY: HCPCS | Performed by: INTERNAL MEDICINE

## 2017-12-22 PROCEDURE — 25010000002 PROPOFOL 1000 MG/100ML EMULSION: Performed by: NURSE ANESTHETIST, CERTIFIED REGISTERED

## 2017-12-22 PROCEDURE — 25010000002 PROPOFOL 1000 MG/ML EMULSION: Performed by: NURSE ANESTHETIST, CERTIFIED REGISTERED

## 2017-12-22 PROCEDURE — 45380 COLONOSCOPY AND BIOPSY: CPT | Performed by: INTERNAL MEDICINE

## 2017-12-22 RX ORDER — PROPOFOL 10 MG/ML
INJECTION, EMULSION INTRAVENOUS AS NEEDED
Status: DISCONTINUED | OUTPATIENT
Start: 2017-12-22 | End: 2017-12-22 | Stop reason: SURG

## 2017-12-22 RX ORDER — SODIUM CHLORIDE 9 MG/ML
70 INJECTION, SOLUTION INTRAVENOUS CONTINUOUS PRN
Status: DISCONTINUED | OUTPATIENT
Start: 2017-12-22 | End: 2017-12-22 | Stop reason: HOSPADM

## 2017-12-22 RX ORDER — KETAMINE HYDROCHLORIDE 50 MG/ML
INJECTION, SOLUTION, CONCENTRATE INTRAMUSCULAR; INTRAVENOUS AS NEEDED
Status: DISCONTINUED | OUTPATIENT
Start: 2017-12-22 | End: 2017-12-22 | Stop reason: SURG

## 2017-12-22 RX ADMIN — SODIUM CHLORIDE 70 ML/HR: 9 INJECTION, SOLUTION INTRAVENOUS at 08:07

## 2017-12-22 RX ADMIN — PROPOFOL 100 MG: 10 INJECTION, EMULSION INTRAVENOUS at 09:13

## 2017-12-22 RX ADMIN — SODIUM CHLORIDE: 9 INJECTION, SOLUTION INTRAVENOUS at 09:05

## 2017-12-22 RX ADMIN — PROPOFOL 40 MG: 10 INJECTION, EMULSION INTRAVENOUS at 09:33

## 2017-12-22 RX ADMIN — PROPOFOL 100 MCG/KG/MIN: 10 INJECTION, EMULSION INTRAVENOUS at 09:13

## 2017-12-22 RX ADMIN — KETAMINE HYDROCHLORIDE 25 MG: 50 INJECTION, SOLUTION INTRAMUSCULAR; INTRAVENOUS at 09:13

## 2017-12-22 NOTE — PLAN OF CARE
Problem: GI Endoscopy (Adult)  Goal: Signs and Symptoms of Listed Potential Problems Will be Absent or Manageable (GI Endoscopy)  Outcome: Ongoing (interventions implemented as appropriate)   12/22/17 5252   GI Endoscopy   Problems Assessed (GI Endoscopy) all   Problems Present (GI Endoscopy) none

## 2017-12-22 NOTE — H&P
"Chief complaint:  Colon Cancer Screen, H/O Colon polyps(including colonic lesion), Diarrhea, Periumbilical pain    History of present illness:     There is no history of:  Nausea, Vomiting, Reflux, Dysphagia, BH Change, Constipation, Hematemesis, Melena, BRBPR, Pancreatic or Liver Disease.    Past medical history:   Past Medical History:   Diagnosis Date   • Asthma     ALLERGY INDUCED    • Body piercing     EARS   • Bronchitis    • Cancer 2017    basal cell carcinoma, NOSE    • Colonic polyp     REPORTS HAD A \"CARPET GROWTH THAT REQUIRED A PORTION OF HER COLON BE REMOVED\"   • History of bronchitis    • History of migraine    • Seasonal allergies    • Wears eyeglasses    • Wears glasses        Surgical history:    Past Surgical History:   Procedure Laterality Date   • COLON RESECTION N/A 9/7/2016    Procedure: LOW ANTERIOR COLON RESECTION LAPAROSCOPIC  WITH DAVINCI SI ROBOT VS EXTENDED LEFT COLECTOMY WITH TAP BLOCK;  Surgeon: Isra Melendez MD;  Location:  [facility];  Service:    • COLONOSCOPY      2016   • PROCTOSCOPY N/A 9/7/2016    Procedure: PROCTOSCOPY RIGID;  Surgeon: Isra Melendez MD;  Location:  [facility];  Service:    • SKIN CANCER EXCISION     • WISDOM TOOTH EXTRACTION         Social history:   ETOH: No  Tobacco Use:  No  Other Notes:    Allergies:  Corn-containing products and Mushroom extract complex    Food Allergies:  Corn/Corn Products, Mushrooms  Latex allergy: None  Contrast allergy: None    Medications:  Prescriptions Prior to Admission   Medication Sig Dispense Refill Last Dose   • aspirin 81 MG EC tablet Take 81 mg by mouth Daily.   Past Month at Unknown time   • colestipol (COLESTID) 1 g tablet Take 1 g by mouth Daily As Needed (DIARRHEA).   12/1/2017   • loratadine-pseudoephedrine (CLARITIN-D 12 HOUR) 5-120 MG per 12 hr tablet Take 1 tablet by mouth 2 (Two) Times a Day.   Past Month at Unknown time   • Multiple Vitamins-Minerals (HAIR SKIN AND NAILS FORMULA PO) Take 1 tablet by mouth Daily.   " "Past Month at Unknown time   • Multiple Vitamins-Minerals (MULTIVITAMIN ADULT PO) Take 1 tablet by mouth daily.   Past Month at Unknown time   • PROAIR  (90 BASE) MCG/ACT inhaler Inhale 1 puff Every 6 (Six) Hours As Needed for Wheezing.   Past Month at Unknown time   • SOOLANTRA 1 % cream Apply 1 application topically Take As Directed.   11/8/2017 at Unknown time   • TURMERIC PO Take 1 tablet by mouth Daily.   Past Month at Unknown time       Review of systems:   Constitutional: No recent:  Fever, Weight loss or Night sweats, no Glaucoma.  Respiratory: No recent: Hemoptysis, SOA, or Sputum. No COPD or JAYDON.  Cardiovascular: No Recent: Chest Pains, Orthopnea, PND, Palpitations or MI.     No history of: HTN, CAD, MI, CHF, VHD, RHD, PVD, or Arrhythmia.  Endocrine: No history of: DM, Hypothyroidism or Hyperthyroidism.  Genitourinary: No history of: Renal Failure, Kidney Stones, or Recent UTI.  Musculoskeletal: No history of: OA, RA, Gout, SLE or Fibromyalgia.  Neurological: No history of: Dementia, Migraines, RLS, Recent Seizures, CVA, TIA.   Hem. Oncology: No history of: Anemia.  Psychiatric: No history of: Depression or Anxiety.     VITAL SIGNS:    Blood pressure 109/69, pulse 73, temperature 98.9 °F (37.2 °C), temperature source Temporal Artery , resp. rate 18, height 167.6 cm (66\"), weight 59.9 kg (132 lb), SpO2 99 %.    PHYSICAL EXAMINATION:   HEENT: Normal.   Lungs: Clear to auscultation.  Heart: No S3, no murmur.    Abdomen: Soft.  BS+ ND, NT  Extremities: No edema.  No cyanosis.  Neuro: Alert X 3. No focal deficit.    Assessment: Colon Cancer Screen, H/O Colon polyps(including colonic lesion), Diarrhea, Periumbilical pain    Plan:   Colonoscopy    Risks/Benefits:  The potential benefits, risk and/or side effects of the procedure and alternatives have been discussed with the patient/authorized representative and questions were answered.     "

## 2017-12-22 NOTE — OP NOTE
PROCEDURE:  Colonoscopy to the terminal ileum with biopsies.     DATE OF PROCEDURE:  December 22, 2017    REFERRING PROVIDER:  Philipp Boogie MD     INSTRUMENT USED:  Olympus PCF H 190 videocolonoscope.      INDICATIONS OF THE PROCEDURE:  This is a 52-year-old white female for colon cancer screening.  There is history of diarrhea.  Previously the patient had a large colonic lesion resected.      BIOPSIES: Random biopsies were obtained from the colon including rectum.  Biopsies were obtained from the anastomotic site.       PHOTOGRAPHS:  Photographs were included in the medical records.     MEDICATIONS:  MAC.       CONSENT/PREPROCEDURE EVALUATION:  Risks, benefits, alternatives and options of the procedure including risks of sedation/anesthesia were discussed with the patient and informed consent was obtained prior to the procedure.  History and physical examination were performed and nothing precluded the test.      REPORT:  The patient was placed in left lateral decubitus position and a digital examination was performed.  Once under the influence of IV sedation, the instrument was inserted into the rectum and advanced under direct vision to cecum which was identified by the ileocecal valve, triradiate folds and appendiceal orifice. The scope was then maneuvered into the terminal ileum.        FINDINGS:      Digital rectal examination:  Good anal tone.  No perianal pathology.  No mass.        Terminal ileum:  7-8 cm.  Normal.     Cecum and ascending colon: Normal.       Hepatic flexure, transverse colon, splenic flexure:  Normal.         Descending colon, sigmoid colon and rectum:  Changes suggestive of sigmoid colectomy and anastomosis were noted.  Anastomotic site appeared to be within normal limits.  A tiny erosion was seen at the anastomotic site-at a staple site.  Scant vascular ectasia was seen. No endoscopic evidence of colitis was seen.  Random biopsies were obtained from the colon upon withdrawal of the  scope. A retroflex examination within the rectum revealed internal hemorrhoids.        The scope was then straightened, the lower GI tract was decompressed, and the scope was pulled out of the patient.  The patient tolerated the procedure well.  There were no immediate complications and the patient was transferred in stable condition for post procedure observation.      TECHNICAL DATA:   1. [place] prep score: 9 (3+3+3).    2. Anus to cecal time: 4  minutes.  3. Difficulty of examination:  Average.  The colon was noted to be redundant and somewhat dilated.  4. Withdrawal time: 8 minutes.  5. Procedure time:  18 minutes  6. Retroflex examination in right colon: Yes.    7. Second look Rectum to cecum with decompression: Yes.    DIAGNOSES:    1. Left colonic anastomosis.  Scant erosion at the anastomotic site.  Biopsies of the anastomotic site.  2. Internal hemorrhoids.  3. No endoscopic evidence of colitis was seen.  Random biopsies were obtained from the colon upon withdrawal of the scope.  4. Scant vascular ectasia.  5. The colon was noted to be dilated and redundant.      RECOMMENDATIONS:     1. Dietary instructions.  2. Follow biopsies.    3. Follow-up:  3-4 weeks.    4. Followup colonoscopy:  5 years.          Thank you very much for letting me participate in the care of this patient. Please do not hesitate to call me if you have any questions.

## 2017-12-22 NOTE — ANESTHESIA POSTPROCEDURE EVALUATION
Patient: Jeni Newby    Procedure Summary     Date Anesthesia Start Anesthesia Stop Room / Location    12/22/17 0905 0938 [facility] END[facility]COPY 2 / [facility]       Procedure Diagnosis Surgeon Provider    COLONOSCOPY WITH BIOPSIES (N/A Anus) Periumbilical pain; Colon cancer screening; Lesion of colon  (Periumbilical pain [R10.33]; Colon cancer screening [Z12.11]; Lesion of colon [K63.9]) MD Hilario Velazquez, SASHA          Anesthesia Type: MAC  Last vitals  BP   109/69 (12/22/17 0754)   Temp   98.9 °F (37.2 °C) (12/22/17 0754)   Pulse   73 (12/22/17 0754)   Resp   18 (12/22/17 0754)     SpO2   99 % (12/22/17 0754)     Post Anesthesia Care and Evaluation    Patient location during evaluation: PACU  Patient participation: complete - patient participated  Level of consciousness: awake and alert  Pain score: 0  Pain management: satisfactory to patient  Airway patency: patent  Anesthetic complications: No anesthetic complications  PONV Status: none  Cardiovascular status: acceptable and hemodynamically stable  Respiratory status: acceptable  Hydration status: acceptable

## 2017-12-22 NOTE — ANESTHESIA PREPROCEDURE EVALUATION
Anesthesia Evaluation     Patient summary reviewed and Nursing notes reviewed   NPO Solid Status: > 8 hours  NPO Liquid Status: > 8 hours     Airway   Mallampati: I  TM distance: >3 FB  Neck ROM: full  no difficulty expected  Dental      Pulmonary - normal exam    breath sounds clear to auscultation  (+) asthma, recent URI (rib pain left sternal border secondary to coughing/ bronchitis) resolved,   Cardiovascular - negative cardio ROS and normal exam    Rhythm: regular  Rate: normal        Neuro/Psych- negative ROS  GI/Hepatic/Renal/Endo - negative ROS     Musculoskeletal (-) negative ROS    Abdominal    Substance History - negative use     OB/GYN negative ob/gyn ROS         Other      history of cancer                                  Anesthesia Plan    ASA 2     MAC     intravenous induction   Anesthetic plan and risks discussed with patient.

## 2017-12-26 ENCOUNTER — OFFICE VISIT (OUTPATIENT)
Dept: INTERNAL MEDICINE | Facility: CLINIC | Age: 52
End: 2017-12-26

## 2017-12-26 VITALS
DIASTOLIC BLOOD PRESSURE: 60 MMHG | TEMPERATURE: 98.8 F | SYSTOLIC BLOOD PRESSURE: 102 MMHG | HEART RATE: 58 BPM | OXYGEN SATURATION: 99 %

## 2017-12-26 DIAGNOSIS — M94.0 COSTOCHONDRITIS: Primary | ICD-10-CM

## 2017-12-26 PROCEDURE — 99213 OFFICE O/P EST LOW 20 MIN: CPT | Performed by: INTERNAL MEDICINE

## 2017-12-26 RX ORDER — PREDNISONE 20 MG/1
20 TABLET ORAL DAILY
Qty: 5 TABLET | Refills: 0 | Status: SHIPPED | OUTPATIENT
Start: 2017-12-26 | End: 2018-02-22

## 2017-12-26 NOTE — PROGRESS NOTES
Chief Complaint   Patient presents with   • Abstract     Pt states she's been getting over bronchitis and coughing alot. States she is now having sharp pain in her chest, mostly on left side.      Subjective   Jeni Newby is a 52 y.o. female.     HPI Comments: Patient is here for follow-up of recent bronchitis and left-sided rib pain.  She was seen per PCP on December 4 with complaints of left-sided rib pain.  Patient underwent recent colonoscopy on December 22 with no abnormalities noted.  She noted more secretions after procedure.   She is having worse pain over her left ribs.  It hurts to cough, breath, roll over, move arms.  Since last week she is having more loose secretions again.  She coughs up a little bit the morning, clear, otherwise cough is dry now.   She is no longer having any SOA or wheezing.   She is using claritin, but is no longer requiring the inhaler.        The following portions of the patient's history were reviewed and updated as appropriate: allergies, current medications, past family history, past medical history, past social history, past surgical history and problem list.    Review of Systems   Constitutional: Negative.    HENT: Negative.    Respiratory: Positive for cough. Negative for shortness of breath and wheezing.    Cardiovascular: Negative.    Gastrointestinal: Negative.    Musculoskeletal:        Chest wall pain over left mid ribs   All other systems reviewed and are negative.      Objective   /60  Pulse 58  Temp 98.8 °F (37.1 °C)  LMP  (LMP Unknown) Comment: PATIENT REPORTS LMP 2-3 YEARS AGO. DENIES ANY HX OF ABLATION.  SpO2 99%  There is no height or weight on file to calculate BMI.  Physical Exam   Constitutional: She is oriented to person, place, and time. She appears well-developed and well-nourished.   HENT:   Head: Normocephalic and atraumatic.   Right Ear: External ear normal.   Mouth/Throat: Oropharynx is clear and moist.   Eyes: EOM are normal. Pupils are  equal, round, and reactive to light.   Cardiovascular: Normal rate, regular rhythm and normal heart sounds.    Pulmonary/Chest: Effort normal and breath sounds normal. No respiratory distress. She has no wheezes. She has no rales.   Musculoskeletal: She exhibits no edema.   Chest wall with pain over mid ribs, sixth, seventh and eighth intercostal space with palpation   Neurological: She is alert and oriented to person, place, and time.   Psychiatric: She has a normal mood and affect. Judgment normal.   Nursing note and vitals reviewed.      Assessment/Plan   Jeni Newby is here today and the following problems have been addressed:      Jeni was seen today for abstract.    Diagnoses and all orders for this visit:    Costochondritis    Other orders  -     predniSONE (DELTASONE) 20 MG tablet; Take 1 tablet by mouth Daily.    Recommend heat to chest/back every 2-3 hours for 15-20 minutes  Take aleve or IBU as directed  Also given prednisone to take if pain does not improve over next few day with above measures ( she will start in a few days if needed )  No repetitive movement with arms or chest wall is recommended    RTC prn  Please note that portions of this note were completed with a voice recognition program.  Efforts were made to edit dictation, but occasionally words are mistranscribed.

## 2017-12-30 LAB
LAB AP CASE REPORT: NORMAL
Lab: NORMAL
PATH REPORT.FINAL DX SPEC: NORMAL

## 2018-02-22 ENCOUNTER — OFFICE VISIT (OUTPATIENT)
Dept: GASTROENTEROLOGY | Facility: CLINIC | Age: 53
End: 2018-02-22

## 2018-02-22 VITALS
WEIGHT: 140 LBS | TEMPERATURE: 97.4 F | DIASTOLIC BLOOD PRESSURE: 69 MMHG | SYSTOLIC BLOOD PRESSURE: 102 MMHG | HEART RATE: 75 BPM | BODY MASS INDEX: 22.5 KG/M2 | RESPIRATION RATE: 18 BRPM | HEIGHT: 66 IN

## 2018-02-22 DIAGNOSIS — R19.7 DIARRHEA, UNSPECIFIED TYPE: Primary | ICD-10-CM

## 2018-02-22 DIAGNOSIS — K59.00 CONSTIPATION, UNSPECIFIED CONSTIPATION TYPE: ICD-10-CM

## 2018-02-22 DIAGNOSIS — D12.6 ADENOMATOUS POLYP OF COLON, UNSPECIFIED PART OF COLON: ICD-10-CM

## 2018-02-22 PROCEDURE — 99214 OFFICE O/P EST MOD 30 MIN: CPT | Performed by: INTERNAL MEDICINE

## 2018-02-24 LAB
IGA SERPL-MCNC: 101 MG/DL (ref 87–352)
TTG IGG SER-ACNC: 3 U/ML (ref 0–5)

## 2018-07-20 ENCOUNTER — TRANSCRIBE ORDERS (OUTPATIENT)
Dept: MAMMOGRAPHY | Facility: HOSPITAL | Age: 53
End: 2018-07-20

## 2018-07-20 DIAGNOSIS — Z12.31 VISIT FOR SCREENING MAMMOGRAM: Primary | ICD-10-CM

## 2018-08-09 ENCOUNTER — HOSPITAL ENCOUNTER (OUTPATIENT)
Dept: MAMMOGRAPHY | Facility: HOSPITAL | Age: 53
Discharge: HOME | End: 2018-08-09
Attending: OBSTETRICS & GYNECOLOGY | Admitting: OBSTETRICS & GYNECOLOGY

## 2018-08-09 DIAGNOSIS — Z12.31 VISIT FOR SCREENING MAMMOGRAM: ICD-10-CM

## 2018-08-09 PROCEDURE — 77067 SCR MAMMO BI INCL CAD: CPT | Performed by: RADIOLOGY

## 2018-08-09 PROCEDURE — 77063 BREAST TOMOSYNTHESIS BI: CPT

## 2018-08-09 PROCEDURE — 77063 BREAST TOMOSYNTHESIS BI: CPT | Performed by: RADIOLOGY

## 2018-08-09 PROCEDURE — 77067 SCR MAMMO BI INCL CAD: CPT

## 2018-08-27 ENCOUNTER — OFFICE VISIT (OUTPATIENT)
Dept: INTERNAL MEDICINE | Facility: CLINIC | Age: 53
End: 2018-08-27

## 2018-08-27 VITALS
BODY MASS INDEX: 22.02 KG/M2 | OXYGEN SATURATION: 98 % | HEIGHT: 66 IN | TEMPERATURE: 98 F | DIASTOLIC BLOOD PRESSURE: 60 MMHG | SYSTOLIC BLOOD PRESSURE: 90 MMHG | HEART RATE: 74 BPM | WEIGHT: 137 LBS

## 2018-08-27 DIAGNOSIS — Z00.00 ROUTINE GENERAL MEDICAL EXAMINATION AT A HEALTH CARE FACILITY: Primary | ICD-10-CM

## 2018-08-27 PROCEDURE — 99396 PREV VISIT EST AGE 40-64: CPT | Performed by: INTERNAL MEDICINE

## 2018-08-27 NOTE — PROGRESS NOTES
"Chief Complaint   Patient presents with   • Annual Exam     currently holding all medications - using shakeology 3-4 times a week        Jeni Newby is a 53 y.o. female and is here for a comprehensive physical exam. The patient reports no problems.     History:  LMP: No LMP recorded (lmp unknown). Patient is postmenopausal.  Menopause at 50 years  Last pap date: 2016  Abnormal pap? no  : 0  Para: 0    Do you take any herbs or supplements that were not prescribed by a doctor? yes  Are you taking calcium supplements? no  Are you taking aspirin daily? no      Health Habits:  Dental Exam. up to date  Eye Exam. up to date  Exercise: 6 times/week.  Current exercise activities include: cardiovascular workout on exercise equipment, yoga and golf, tennis    Health Maintenance   Topic Date Due   • TDAP/TD VACCINES (1 - Tdap) 1983   • HEPATITIS C SCREENING  2016   • PAP SMEAR  2016   • ZOSTER VACCINE (2 of 3) 2017   • ANNUAL PHYSICAL  2018   • INFLUENZA VACCINE  2018   • MAMMOGRAM  2020   • COLONOSCOPY  2027       PMH, PSH, SocHx, FamHx, Allergies, and Medications: Reviewed and updated in the Visit Navigator.     Allergies   Allergen Reactions   • Corn-Containing Products      Cramps and diarrhea   • Mushroom Extract Complex Swelling     Throat swelling     Past Medical History:   Diagnosis Date   • Asthma     ALLERGY INDUCED    • Body piercing     EARS   • Bronchitis    • Cancer (CMS/HCC) 2017    basal cell carcinoma, NOSE    • Colonic polyp     REPORTS HAD A \"CARPET GROWTH THAT REQUIRED A PORTION OF HER COLON BE REMOVED\"   • History of bronchitis    • History of migraine    • Seasonal allergies    • Wears eyeglasses    • Wears glasses      Past Surgical History:   Procedure Laterality Date   • COLON RESECTION N/A 2016    Procedure: LOW ANTERIOR COLON RESECTION LAPAROSCOPIC  WITH DAVINCI SI ROBOT VS EXTENDED LEFT COLECTOMY WITH TAP BLOCK;  Surgeon: Isra CORONA" MD Nora;  Location:  [facility];  Service:    • COLONOSCOPY      2016   • COLONOSCOPY N/A 12/22/2017    Procedure: COLONOSCOPY WITH BIOPSIES;  Surgeon: Tino Stapleton MD;  Location:  [facility];  Service:    • PROCTOSCOPY N/A 9/7/2016    Procedure: PROCTOSCOPY RIGID;  Surgeon: Isra Melendez MD;  Location:  [facility];  Service:    • SKIN CANCER EXCISION     • WISDOM TOOTH EXTRACTION       Social History     Social History   • Marital status: Single     Spouse name: N/A   • Number of children: N/A   • Years of education: N/A     Occupational History   • Not on file.     Social History Main Topics   • Smoking status: Never Smoker   • Smokeless tobacco: Never Used   • Alcohol use No   • Drug use: No   • Sexual activity: Defer     Other Topics Concern   • Not on file     Social History Narrative   • No narrative on file     Family History   Problem Relation Age of Onset   • Ovarian cancer Maternal Grandmother 64   • Breast cancer Maternal Aunt 86       Review of Systems  Review of Systems   Constitutional: Negative.  Negative for activity change, appetite change, fatigue and fever.   HENT: Positive for ear pain. Negative for congestion, ear discharge and trouble swallowing.    Eyes: Negative for photophobia and visual disturbance.   Respiratory: Negative for cough and shortness of breath.    Cardiovascular: Negative for chest pain and palpitations.   Gastrointestinal: Positive for abdominal pain. Negative for abdominal distention, constipation, diarrhea, nausea and vomiting.   Endocrine: Negative.    Genitourinary: Negative for dysuria, hematuria and urgency.   Musculoskeletal: Positive for arthralgias. Negative for back pain, joint swelling and myalgias.   Skin: Negative for color change and rash.   Allergic/Immunologic: Negative.    Neurological: Negative for dizziness, weakness, light-headedness and headaches.   Hematological: Negative for adenopathy. Does not bruise/bleed easily.   Psychiatric/Behavioral:  "Negative for agitation, confusion and dysphoric mood. The patient is not nervous/anxious.        Vitals:    08/27/18 1317   BP: 90/60   Pulse: 74   Temp: 98 °F (36.7 °C)   SpO2: 98%       Objective   BP 90/60   Pulse 74   Temp 98 °F (36.7 °C)   Ht 167.6 cm (65.98\")   Wt 62.1 kg (137 lb)   LMP  (LMP Unknown) Comment: PATIENT REPORTS LMP 2-3 YEARS AGO. DENIES ANY HX OF ABLATION.  SpO2 98%   BMI 22.13 kg/m²     Physical Exam   Constitutional: She is oriented to person, place, and time. She appears well-developed and well-nourished. No distress.   HENT:   Nose: Nose normal.   Mouth/Throat: Oropharynx is clear and moist.   Eyes: Conjunctivae and EOM are normal. No scleral icterus.   Neck: No tracheal deviation present. No thyromegaly present.   Cardiovascular: Normal rate and regular rhythm.  Exam reveals no friction rub.    No murmur heard.  Pulmonary/Chest: No respiratory distress. She has no wheezes. She has no rales.   Abdominal: Soft. She exhibits no distension and no mass. There is no tenderness. There is no guarding.       tender   Musculoskeletal: Normal range of motion. She exhibits no deformity.   Lymphadenopathy:     She has no cervical adenopathy.   Neurological: She is alert and oriented to person, place, and time. She has normal reflexes. No cranial nerve deficit. Coordination normal.   Skin: Skin is warm and dry. No rash noted. No erythema.   Psychiatric: She has a normal mood and affect. Her behavior is normal. Judgment and thought content normal.        Assessment/Plan   1. Healthy female exam.    2. Patient Counseling: Including but not Limited to the following, when appropriate:  --Nutrition: Stressed importance of moderation in sodium/caffeine intake, saturated fat and cholesterol, caloric balance, sufficient intake of fresh fruits, vegetables, fiber, calcium,  --Discussed the issue of  calcium supplement,  --Exercise: Stressed the importance of regular exercise.   --Substance Abuse: Discussed " cessation/primary prevention of tobacco, alcohol, or other drug use; driving or other dangerous activities under the influence; availability of treatment for abuse, as indicated based on social history.    --Sexuality: Discussed sexually transmitted diseases, partner selection, use of condoms, avoidance of unintended pregnancy  and contraceptive alternatives.   --Injury prevention: Discussed safety belts, safety helmets, smoke detector, smoking near bedding or upholstery.   --Dental health: Discussed importance of regular tooth brushing, flossing, and dental visits.  --Immunizations reviewed.  --Discussed benefits of colon cancer screening.upto date      3. Discussed the patient's BMI with her.  The BMI is in the acceptable range  4. No Follow-up on file.  5. Age-appropriate Screening Scheduled  6. There are no Patient Instructions on file for this visit.    Assessment/Plan     Jeni was seen today for annual exam and earache.    Diagnoses and all orders for this visit:    Routine general medical examination at a health care facility

## 2018-08-28 LAB
25(OH)D3+25(OH)D2 SERPL-MCNC: 37.8 NG/ML
HCV AB S/CO SERPL IA: 0.2 S/CO RATIO (ref 0–0.9)

## 2018-11-29 ENCOUNTER — OFFICE VISIT (OUTPATIENT)
Dept: INTERNAL MEDICINE | Facility: CLINIC | Age: 53
End: 2018-11-29

## 2018-11-29 VITALS
OXYGEN SATURATION: 99 % | SYSTOLIC BLOOD PRESSURE: 90 MMHG | HEART RATE: 73 BPM | DIASTOLIC BLOOD PRESSURE: 67 MMHG | BODY MASS INDEX: 22.02 KG/M2 | WEIGHT: 137 LBS | TEMPERATURE: 98.1 F | HEIGHT: 66 IN

## 2018-11-29 DIAGNOSIS — Z23 NEED FOR HEPATITIS VACCINATION: Primary | ICD-10-CM

## 2018-11-29 DIAGNOSIS — M79.672 LEFT FOOT PAIN: ICD-10-CM

## 2018-11-29 PROCEDURE — 90632 HEPA VACCINE ADULT IM: CPT | Performed by: INTERNAL MEDICINE

## 2018-11-29 PROCEDURE — 90471 IMMUNIZATION ADMIN: CPT | Performed by: INTERNAL MEDICINE

## 2018-11-29 PROCEDURE — 99213 OFFICE O/P EST LOW 20 MIN: CPT | Performed by: INTERNAL MEDICINE

## 2018-11-29 NOTE — PROGRESS NOTES
"Subjective  Jeni Newby is a 53 y.o. female    HPI coming in with complaints of numbness in her left middle toe ongoing for a few months now. No new trauma is very active with yoga, tenderness. Complains of fullness in both her years especially on the left side with occasional drinking and hearing loss.    The following portions of the patient's history were reviewed and updated as appropriate: allergies, current medications, past family history, past medical history, past social history, past surgical history, and problem list.     Review of Systems   Constitutional: Negative.  Negative for activity change, appetite change, fatigue and fever.   HENT: Negative for congestion, ear discharge, ear pain and trouble swallowing.    Eyes: Negative for photophobia and visual disturbance.   Respiratory: Negative for cough and shortness of breath.    Cardiovascular: Negative for chest pain and palpitations.   Gastrointestinal: Negative for abdominal distention, abdominal pain, constipation, diarrhea, nausea and vomiting.   Endocrine: Negative.    Genitourinary: Negative for dysuria, hematuria and urgency.   Musculoskeletal: Negative for arthralgias, back pain, joint swelling and myalgias.   Skin: Negative for color change and rash.   Allergic/Immunologic: Negative.    Neurological: Negative for dizziness, weakness, light-headedness and headaches.   Hematological: Negative for adenopathy. Does not bruise/bleed easily.   Psychiatric/Behavioral: Negative for agitation, confusion and dysphoric mood. The patient is not nervous/anxious.        Visit Vitals  BP 90/67   Pulse 73   Temp 98.1 °F (36.7 °C) (Temporal)   Ht 167.6 cm (65.98\")   Wt 62.1 kg (137 lb)   LMP  (LMP Unknown) Comment: PATIENT REPORTS LMP 2-3 YEARS AGO. DENIES ANY HX OF ABLATION.   SpO2 99%   BMI 22.13 kg/m²       Objective  Physical Exam   Constitutional: She is oriented to person, place, and time. She appears well-developed and well-nourished. No distress. "   HENT:   Nose: Nose normal.   Mouth/Throat: Oropharynx is clear and moist.   Eyes: Conjunctivae and EOM are normal. No scleral icterus.   Neck: No tracheal deviation present. No thyromegaly present.   Cardiovascular: Normal rate and regular rhythm. Exam reveals no friction rub.   No murmur heard.  Pulmonary/Chest: No respiratory distress. She has no wheezes. She has no rales.   Abdominal: Soft. She exhibits no distension and no mass. There is no tenderness. There is no guarding.   Musculoskeletal: Normal range of motion. She exhibits deformity.   Lymphadenopathy:     She has no cervical adenopathy.   Neurological: She is alert and oriented to person, place, and time. She has normal reflexes. No cranial nerve deficit. Coordination normal.   Skin: Skin is warm and dry. No rash noted. No erythema.   Psychiatric: She has a normal mood and affect. Her behavior is normal. Judgment and thought content normal.       Diagnoses and all orders for this visit:    Need for hepatitis vaccination  -     Hepatitis A Vaccine Adult IM    Left foot pain. Has some evidence of DJD.

## 2019-01-21 ENCOUNTER — TELEPHONE (OUTPATIENT)
Dept: INTERNAL MEDICINE | Facility: CLINIC | Age: 54
End: 2019-01-21

## 2019-01-21 NOTE — TELEPHONE ENCOUNTER
Patient stating that she had just missed a call from doctor a few minutes ago and is requesting another call back. Please advise. Patient can be reached at [phone]

## 2019-02-28 ENCOUNTER — TELEPHONE (OUTPATIENT)
Dept: INTERNAL MEDICINE | Facility: CLINIC | Age: 54
End: 2019-02-28

## 2019-05-24 ENCOUNTER — OFFICE VISIT (OUTPATIENT)
Dept: INTERNAL MEDICINE | Facility: CLINIC | Age: 54
End: 2019-05-24

## 2019-05-24 VITALS
HEIGHT: 66 IN | WEIGHT: 135.8 LBS | BODY MASS INDEX: 21.83 KG/M2 | DIASTOLIC BLOOD PRESSURE: 55 MMHG | OXYGEN SATURATION: 96 % | SYSTOLIC BLOOD PRESSURE: 86 MMHG | HEART RATE: 65 BPM | TEMPERATURE: 97.4 F

## 2019-05-24 DIAGNOSIS — K58.0 IRRITABLE BOWEL SYNDROME WITH DIARRHEA: Primary | ICD-10-CM

## 2019-05-24 PROCEDURE — 99213 OFFICE O/P EST LOW 20 MIN: CPT | Performed by: INTERNAL MEDICINE

## 2019-05-24 NOTE — PROGRESS NOTES
"Subjective  Jeni Newby is a 54 y.o. female    HPI coming in for a follow-up symptoms of intermittent bloating and loose stools especially while traveling.  These seem to have started after a cholecystectomy no associated weight loss denies blood in her stools she denies lightheadedness dizziness.  Does better with a high-fiber diet    The following portions of the patient's history were reviewed and updated as appropriate: allergies, current medications, past family history, past medical history, past social history, past surgical history, and problem list.     Review of Systems   Constitutional: Negative.  Negative for activity change, appetite change, fatigue and fever.   HENT: Negative for congestion, ear discharge, ear pain and trouble swallowing.    Eyes: Negative for photophobia and visual disturbance.   Respiratory: Negative for cough and shortness of breath.    Cardiovascular: Negative for chest pain and palpitations.   Gastrointestinal: Positive for abdominal pain. Negative for abdominal distention, constipation, diarrhea, nausea and vomiting.   Endocrine: Negative.    Genitourinary: Negative for dysuria, hematuria and urgency.   Musculoskeletal: Negative for arthralgias, back pain, joint swelling and myalgias.   Skin: Negative for color change and rash.   Allergic/Immunologic: Negative.    Neurological: Negative for dizziness, weakness, light-headedness and headaches.   Hematological: Negative for adenopathy. Does not bruise/bleed easily.   Psychiatric/Behavioral: Negative for agitation, confusion and dysphoric mood. The patient is not nervous/anxious.        Visit Vitals  BP (!) 86/55 Comment: Automatic   Pulse 65   Temp 97.4 °F (36.3 °C) (Temporal)   Ht 167.6 cm (65.98\")   Wt 61.6 kg (135 lb 12.8 oz)   LMP  (LMP Unknown) Comment: PATIENT REPORTS LMP 2-3 YEARS AGO. DENIES ANY HX OF ABLATION.   SpO2 96%   BMI 21.93 kg/m²       Objective  Physical Exam   Constitutional: She is oriented to person, " place, and time. She appears well-developed and well-nourished. No distress.   HENT:   Nose: Nose normal.   Mouth/Throat: Oropharynx is clear and moist.   Eyes: Conjunctivae and EOM are normal. No scleral icterus.   Neck: No tracheal deviation present. No thyromegaly present.   Cardiovascular: Normal rate and regular rhythm. Exam reveals no friction rub.   No murmur heard.  Pulmonary/Chest: No respiratory distress. She has no wheezes. She has no rales.   Abdominal: Soft. She exhibits no distension and no mass. There is no tenderness. There is no guarding.   Musculoskeletal: Normal range of motion. She exhibits no deformity.   Lymphadenopathy:     She has no cervical adenopathy.   Neurological: She is alert and oriented to person, place, and time. She has normal reflexes. No cranial nerve deficit. Coordination normal.   Skin: Skin is warm and dry. No rash noted. No erythema.   Psychiatric: She has a normal mood and affect. Her behavior is normal. Judgment and thought content normal.       Diagnoses and all orders for this visit:    Irritable bowel syndrome with diarrhea  A trial of Xifaxan samples given.  Levsin on a as needed basis after that GI consult and labs reviewed appears to be stable

## 2019-08-29 ENCOUNTER — OFFICE VISIT (OUTPATIENT)
Dept: INTERNAL MEDICINE | Facility: CLINIC | Age: 54
End: 2019-08-29

## 2019-08-29 VITALS
HEART RATE: 84 BPM | SYSTOLIC BLOOD PRESSURE: 99 MMHG | TEMPERATURE: 98.3 F | OXYGEN SATURATION: 98 % | DIASTOLIC BLOOD PRESSURE: 63 MMHG | WEIGHT: 135 LBS | BODY MASS INDEX: 21.69 KG/M2 | HEIGHT: 66 IN

## 2019-08-29 DIAGNOSIS — R05.9 COUGH: ICD-10-CM

## 2019-08-29 DIAGNOSIS — Z00.00 ROUTINE GENERAL MEDICAL EXAMINATION AT HEALTH CARE FACILITY: Primary | ICD-10-CM

## 2019-08-29 DIAGNOSIS — R23.3 EASY BRUISING: ICD-10-CM

## 2019-08-29 DIAGNOSIS — E78.2 MIXED HYPERLIPIDEMIA: ICD-10-CM

## 2019-08-29 PROBLEM — R10.33 PERIUMBILICAL PAIN: Status: RESOLVED | Noted: 2017-12-07 | Resolved: 2019-08-29

## 2019-08-29 PROBLEM — M94.0 COSTOCHONDRITIS: Status: RESOLVED | Noted: 2017-12-26 | Resolved: 2019-08-29

## 2019-08-29 PROCEDURE — 99396 PREV VISIT EST AGE 40-64: CPT | Performed by: INTERNAL MEDICINE

## 2019-08-29 NOTE — PROGRESS NOTES
"Chief Complaint   Patient presents with   • Annual Exam     not fasting today - dry cough x 3 weeks        Jeni Newby is a 54 y.o. female and is here for a comprehensive physical exam. The patient reports problems - concerned about easily bruising, has had a dry cough x3 weeks, and is having left foot pain when walking..     History:  LMP: No LMP recorded (lmp unknown). Patient is postmenopausal.  Menopause at 50 years  Last pap date: 2018    Abnormal pap? no  : 0   Para: 0    Do you take any herbs or supplements that were not prescribed by a doctor? yes  Are you taking calcium supplements? yes  Are you taking aspirin daily? no      Health Habits:  Dental Exam. up to date  Eye Exam. up to date  Exercise: 3 times/week.  Current exercise activities include: aerobics, walking and yoga    Health Maintenance   Topic Date Due   • ZOSTER VACCINE (3 of 3) 2019   • INFLUENZA VACCINE  2019   • MAMMOGRAM  2020   • ANNUAL PHYSICAL  2020   • PAP SMEAR  2021   • COLONOSCOPY  2027   • TDAP/TD VACCINES (2 - Td) 2029   • HEPATITIS C SCREENING  Completed       PMH, PSH, SocHx, FamHx, Allergies, and Medications: Reviewed and updated in the Visit Navigator.     Allergies   Allergen Reactions   • Corn-Containing Products      Cramps and diarrhea   • Mushroom Extract Complex Swelling     Throat swelling     Past Medical History:   Diagnosis Date   • Asthma     ALLERGY INDUCED    • Body piercing     EARS   • Bronchitis    • Cancer (CMS/HCC) 2017    basal cell carcinoma, NOSE    • Colonic polyp     REPORTS HAD A \"CARPET GROWTH THAT REQUIRED A PORTION OF HER COLON BE REMOVED\"   • History of bronchitis    • History of migraine    • Seasonal allergies    • Wears eyeglasses    • Wears glasses      Past Surgical History:   Procedure Laterality Date   • COLON RESECTION N/A 2016    Procedure: LOW ANTERIOR COLON RESECTION LAPAROSCOPIC  WITH DAVINCI SI ROBOT VS EXTENDED LEFT " COLECTOMY WITH TAP BLOCK;  Surgeon: Isra Melendez MD;  Location:  [facility];  Service:    • COLONOSCOPY      2016   • COLONOSCOPY N/A 12/22/2017    Procedure: COLONOSCOPY WITH BIOPSIES;  Surgeon: Tino Stapleton MD;  Location:  [facility];  Service:    • PROCTOSCOPY N/A 9/7/2016    Procedure: PROCTOSCOPY RIGID;  Surgeon: Isra Melendez MD;  Location:  [facility];  Service:    • SKIN CANCER EXCISION     • WISDOM TOOTH EXTRACTION       Social History     Socioeconomic History   • Marital status: Single     Spouse name: Not on file   • Number of children: Not on file   • Years of education: Not on file   • Highest education level: Not on file   Tobacco Use   • Smoking status: Never Smoker   • Smokeless tobacco: Never Used   Substance and Sexual Activity   • Alcohol use: No   • Drug use: No   • Sexual activity: Defer     Family History   Problem Relation Age of Onset   • Ovarian cancer Maternal Grandmother 64   • Breast cancer Maternal Aunt 86       Review of Systems  Review of Systems   Constitutional: Negative for fatigue and fever.   HENT: Negative for congestion, ear pain, rhinorrhea, sneezing and sore throat.    Eyes: Negative for redness and itching.   Respiratory: Positive for cough. Negative for shortness of breath and wheezing.    Cardiovascular: Negative for chest pain, palpitations and leg swelling.   Gastrointestinal: Positive for diarrhea. Negative for abdominal pain and constipation.   Genitourinary: Negative for dysuria.   Musculoskeletal: Negative for arthralgias, back pain and myalgias.   Allergic/Immunologic: Positive for environmental allergies.   Neurological: Negative for dizziness and light-headedness.   Hematological: Bruises/bleeds easily.   Psychiatric/Behavioral: The patient is not nervous/anxious.        Vitals:    08/29/19 1318   BP: 99/63   Pulse: 84   Temp: 98.3 °F (36.8 °C)   SpO2: 98%       Objective   BP 99/63 Comment: Automatic  Pulse 84   Temp 98.3 °F (36.8 °C) (Temporal)   Ht  "167.6 cm (65.98\")   Wt 61.2 kg (135 lb)   LMP  (LMP Unknown) Comment: PATIENT REPORTS LMP 2-3 YEARS AGO. DENIES ANY HX OF ABLATION.  SpO2 98%   BMI 21.80 kg/m²     Physical Exam   Constitutional: She is oriented to person, place, and time. She appears well-developed and well-nourished. No distress.   Eyes: Pupils are equal, round, and reactive to light. Right eye exhibits no discharge. Left eye exhibits no discharge.   Cardiovascular: Normal rate, regular rhythm, normal heart sounds and intact distal pulses.   No murmur heard.  Pulmonary/Chest: Effort normal and breath sounds normal.   Cough    Abdominal: Soft. Bowel sounds are normal. She exhibits no mass. There is no tenderness.   Musculoskeletal: Normal range of motion. She exhibits no edema or deformity.        Neurological: She is alert and oriented to person, place, and time.   Skin: Skin is warm and dry. Ecchymosis noted.   Scattered ecchymosis on bilateral lower extremities   Psychiatric: She has a normal mood and affect. Her behavior is normal. Judgment and thought content normal.   Nursing note and vitals reviewed.      The CVD Risk score (D'Agostino, et al., 2008) failed to calculate for the following reasons:    Cannot find a previous HDL lab    Cannot find a previous total cholesterol lab    No results found for: CHOL, CHLPL, TRIG, HDL, LDL, LDLDIRECT  Glucose   Date Value Ref Range Status   09/19/2016 79 70 - 100 mg/dL Final     BUN   Date Value Ref Range Status   12/04/2017 12 7 - 20 mg/dL Final   09/19/2016 14 9 - 23 mg/dL Final     Creatinine   Date Value Ref Range Status   12/04/2017 0.70 0.60 - 1.30 mg/dL Final   09/19/2016 0.60 0.60 - 1.30 mg/dL Final     Sodium   Date Value Ref Range Status   12/04/2017 145 137 - 145 mmol/L Final   09/19/2016 137 132 - 146 mmol/L Final     Potassium   Date Value Ref Range Status   12/04/2017 4.5 3.5 - 5.1 mmol/L Final   09/19/2016 4.6 3.5 - 5.5 mmol/L Final     Chloride   Date Value Ref Range Status "   12/04/2017 104 98 - 107 mmol/L Final   09/19/2016 98 (L) 99 - 109 mmol/L Final     CO2   Date Value Ref Range Status   09/19/2016 30.0 20.0 - 31.0 mmol/L Final     Total CO2   Date Value Ref Range Status   12/04/2017 30.0 26.0 - 30.0 mmol/L Final     Calcium   Date Value Ref Range Status   12/04/2017 10.1 8.4 - 10.2 mg/dL Final   09/19/2016 9.6 8.7 - 10.4 mg/dL Final     Total Protein   Date Value Ref Range Status   09/19/2016 8.0 5.7 - 8.2 g/dL Final     Albumin   Date Value Ref Range Status   12/04/2017 4.20 3.50 - 5.00 g/dL Final   09/19/2016 4.30 3.20 - 4.80 g/dL Final     ALT (SGPT)   Date Value Ref Range Status   12/04/2017 22 13 - 69 U/L Final   09/19/2016 19 7 - 40 U/L Final     AST (SGOT)   Date Value Ref Range Status   12/04/2017 20 15 - 46 U/L Final   09/19/2016 21 0 - 33 U/L Final     Alkaline Phosphatase   Date Value Ref Range Status   12/04/2017 83 38 - 126 U/L Final   09/19/2016 103 (H) 25 - 100 U/L Final     Total Bilirubin   Date Value Ref Range Status   12/04/2017 0.4 0.2 - 1.3 mg/dL Final   09/19/2016 0.5 0.3 - 1.2 mg/dL Final     eGFR Non  Am   Date Value Ref Range Status   12/04/2017 88 >60 mL/min/1.73 Final     Comment:     Abnormal estimated GFR should be followed by more specific  studies to confirm end stage chronic renal disease. The  equation used for calculation may not be accurate for  patients less than 19 years old, greater than 70 years old,  patients at extremes of weight, malnutrition, or with acute  renal dysfunction.       eGFR Non  Amer   Date Value Ref Range Status   09/19/2016 105 >60 mL/min/1.73 Final     A/G Ratio   Date Value Ref Range Status   12/04/2017 1.2 1.0 - 2.0 g/dL Final     BUN/Creatinine Ratio   Date Value Ref Range Status   12/04/2017 17.1 7.1 - 23.5 Final   09/19/2016 23.3 7.0 - 25.0 Final     Anion Gap   Date Value Ref Range Status   09/19/2016 9.0 3.0 - 11.0 mmol/L Final     Lab Results   Component Value Date    WBC 4.40 (L) 12/04/2017    HGB  14.3 12/04/2017    HCT 44.7 12/04/2017    MCV 93.7 12/04/2017     12/04/2017        Assessment/Plan   1. Healthy female exam.  2. Patient Counseling: Including but not Limited to the following, when appropriate:  --Nutrition: Stressed importance of moderation in sodium/caffeine intake, saturated fat and cholesterol, caloric balance, sufficient intake of fresh fruits, vegetables, fiber, calcium, iron, --Discussed the issue of estrogen replacement, calcium supplement,   --Exercise: Stressed the importance of regular exercise.   --Substance Abuse: Discussed cessation/primary prevention of tobacco, alcohol, or other drug use; driving or other dangerous activities under the influence; availability of treatment for abuse, as indicated based on social history.    --Sexuality: Discussed sexually transmitted diseases, partner selection, use of condoms, avoidance of unintended pregnancy  and contraceptive alternatives.   --Injury prevention: Discussed safety belts, safety helmets, smoke detector, smoking near bedding or upholstery.   --Dental health: Discussed importance of regular tooth brushing, flossing, and dental visits.  --Immunizations reviewed.  --Discussed benefits of colon cancer screening.next due in 3 years      3. Discussed the patient's BMI with her.  The BMI is in the acceptable range  4. No Follow-up on file.  5. Age-appropriate Screening Scheduled  6. There are no Patient Instructions on file for this visit.    Assessment/Plan     Jeni was seen today for annual exam.    Diagnoses and all orders for this visit:    Routine general medical examination at health care facility  -     Rubeola Antibody IgG  -     Comprehensive Metabolic Panel  -     CBC (No Diff)  -     Lipid Panel    Mixed hyperlipidemia  - Concerned with increased triglycerides on last lipid panel. Will check lipid panel now. Encouraged diet low in fat and carbohydrates. Continue exercising.     Cough  - Suspect allergic in origin.  Continue Albuterol on as needed basis and trial Claritin.      Easy bruising  - Will check CBC now and escalate work-up t if needed.      1.  This note accurately reflects work and decisions made by me.

## 2019-08-31 LAB
ALBUMIN SERPL-MCNC: 4.3 G/DL (ref 3.5–5.2)
ALBUMIN/GLOB SERPL: 1.4 G/DL
ALP SERPL-CCNC: 79 U/L (ref 39–117)
ALT SERPL-CCNC: 14 U/L (ref 1–33)
AST SERPL-CCNC: 19 U/L (ref 1–32)
BILIRUB SERPL-MCNC: 0.7 MG/DL (ref 0.2–1.2)
BUN SERPL-MCNC: 9 MG/DL (ref 6–20)
BUN/CREAT SERPL: 12.2 (ref 7–25)
CALCIUM SERPL-MCNC: 9.3 MG/DL (ref 8.6–10.5)
CHLORIDE SERPL-SCNC: 100 MMOL/L (ref 98–107)
CHOLEST SERPL-MCNC: 178 MG/DL (ref 0–200)
CO2 SERPL-SCNC: 27.3 MMOL/L (ref 22–29)
CREAT SERPL-MCNC: 0.74 MG/DL (ref 0.57–1)
ERYTHROCYTE [DISTWIDTH] IN BLOOD BY AUTOMATED COUNT: 13.6 % (ref 12.3–15.4)
GLOBULIN SER CALC-MCNC: 3 GM/DL
GLUCOSE SERPL-MCNC: 93 MG/DL (ref 65–99)
HCT VFR BLD AUTO: 46.7 % (ref 34–46.6)
HDLC SERPL-MCNC: 62 MG/DL (ref 40–60)
HGB BLD-MCNC: 14 G/DL (ref 12–15.9)
LDLC SERPL CALC-MCNC: 103 MG/DL (ref 0–100)
MCH RBC QN AUTO: 29.7 PG (ref 26.6–33)
MCHC RBC AUTO-ENTMCNC: 30 G/DL (ref 31.5–35.7)
MCV RBC AUTO: 99.2 FL (ref 79–97)
MEV IGG SER IA-ACNC: 19.1 AU/ML
PLATELET # BLD AUTO: 109 10*3/MM3 (ref 140–450)
POTASSIUM SERPL-SCNC: 4.7 MMOL/L (ref 3.5–5.2)
PROT SERPL-MCNC: 7.3 G/DL (ref 6–8.5)
RBC # BLD AUTO: 4.71 10*6/MM3 (ref 3.77–5.28)
SODIUM SERPL-SCNC: 139 MMOL/L (ref 136–145)
TRIGL SERPL-MCNC: 63 MG/DL (ref 0–150)
VLDLC SERPL CALC-MCNC: 12.6 MG/DL
WBC # BLD AUTO: 7.99 10*3/MM3 (ref 3.4–10.8)

## 2019-09-03 DIAGNOSIS — D69.6 THROMBOCYTOPENIA (HCC): Primary | ICD-10-CM

## 2019-09-20 LAB
ERYTHROCYTE [DISTWIDTH] IN BLOOD BY AUTOMATED COUNT: 12.3 % (ref 12.3–15.4)
HCT VFR BLD AUTO: 45.5 % (ref 34–46.6)
HGB BLD-MCNC: 14.9 G/DL (ref 12–15.9)
MCH RBC QN AUTO: 29.6 PG (ref 26.6–33)
MCHC RBC AUTO-ENTMCNC: 32.7 G/DL (ref 31.5–35.7)
MCV RBC AUTO: 90.5 FL (ref 79–97)
PLATELET # BLD AUTO: 132 10*3/MM3 (ref 140–450)
RBC # BLD AUTO: 5.03 10*6/MM3 (ref 3.77–5.28)
VIT B12 SERPL-MCNC: 1340 PG/ML (ref 211–946)
WBC # BLD AUTO: 5.43 10*3/MM3 (ref 3.4–10.8)

## 2019-10-18 ENCOUNTER — TRANSCRIBE ORDERS (OUTPATIENT)
Dept: MAMMOGRAPHY | Facility: HOSPITAL | Age: 54
End: 2019-10-18

## 2019-10-18 DIAGNOSIS — Z12.31 VISIT FOR SCREENING MAMMOGRAM: Primary | ICD-10-CM

## 2019-11-07 ENCOUNTER — APPOINTMENT (OUTPATIENT)
Dept: MAMMOGRAPHY | Facility: HOSPITAL | Age: 54
End: 2019-11-07

## 2019-11-15 ENCOUNTER — TRANSCRIBE ORDERS (OUTPATIENT)
Dept: MAMMOGRAPHY | Facility: HOSPITAL | Age: 54
End: 2019-11-15

## 2019-11-15 DIAGNOSIS — Z12.31 VISIT FOR SCREENING MAMMOGRAM: Primary | ICD-10-CM

## 2019-12-06 ENCOUNTER — HOSPITAL ENCOUNTER (OUTPATIENT)
Dept: MAMMOGRAPHY | Facility: HOSPITAL | Age: 54
Discharge: HOME | End: 2019-12-06
Admitting: OBSTETRICS & GYNECOLOGY

## 2019-12-06 DIAGNOSIS — Z12.31 VISIT FOR SCREENING MAMMOGRAM: ICD-10-CM

## 2019-12-06 PROCEDURE — 77067 SCR MAMMO BI INCL CAD: CPT

## 2019-12-06 PROCEDURE — 77063 BREAST TOMOSYNTHESIS BI: CPT

## 2019-12-06 PROCEDURE — 77067 SCR MAMMO BI INCL CAD: CPT | Performed by: RADIOLOGY

## 2019-12-06 PROCEDURE — 77063 BREAST TOMOSYNTHESIS BI: CPT | Performed by: RADIOLOGY

## 2020-09-03 ENCOUNTER — OFFICE VISIT (OUTPATIENT)
Dept: INTERNAL MEDICINE | Facility: CLINIC | Age: 55
End: 2020-09-03

## 2020-09-03 VITALS
TEMPERATURE: 98 F | HEIGHT: 66 IN | OXYGEN SATURATION: 99 % | DIASTOLIC BLOOD PRESSURE: 70 MMHG | BODY MASS INDEX: 21.21 KG/M2 | SYSTOLIC BLOOD PRESSURE: 110 MMHG | WEIGHT: 132 LBS | HEART RATE: 60 BPM

## 2020-09-03 DIAGNOSIS — Z23 NEED FOR VACCINATION: Primary | ICD-10-CM

## 2020-09-03 DIAGNOSIS — Z00.00 ROUTINE GENERAL MEDICAL EXAMINATION AT A HEALTH CARE FACILITY: ICD-10-CM

## 2020-09-03 PROCEDURE — 99396 PREV VISIT EST AGE 40-64: CPT | Performed by: INTERNAL MEDICINE

## 2020-09-03 PROCEDURE — 90471 IMMUNIZATION ADMIN: CPT | Performed by: INTERNAL MEDICINE

## 2020-09-03 PROCEDURE — 90686 IIV4 VACC NO PRSV 0.5 ML IM: CPT | Performed by: INTERNAL MEDICINE

## 2020-09-03 RX ORDER — ESCITALOPRAM OXALATE 5 MG/1
5 TABLET ORAL DAILY
Qty: 30 TABLET | Refills: 5 | Status: SHIPPED | OUTPATIENT
Start: 2020-09-03 | End: 2020-10-15

## 2020-09-03 NOTE — PROGRESS NOTES
"Chief Complaint   Patient presents with   • Annual Exam     discuss flu shot, GI concerns, low back pain, diarrhea / constipation, dark stool, dicsuss 8 greens, discuss COVID antibody test        Jeni Newby is a 55 y.o. female and is here for a comprehensive physical exam. The patient reports problems - abdo pain.     History:  LMP: No LMP recorded (lmp unknown). Patient is postmenopausal.  Menopause at 51 years  Last pap date:   Abnormal pap? no  : 0  Para: 0    Do you take any herbs or supplements that were not prescribed by a doctor? yes  Are you taking calcium supplements? no  Are you taking aspirin daily? no      Health Habits:  Dental Exam. up to date  Eye Exam. up to date  Exercise: 5 times/week.  Current exercise activities include: cardiovascular workout on exercise equipment and weightlifting    Health Maintenance   Topic Date Due   • INFLUENZA VACCINE  2020   • ANNUAL PHYSICAL  2020   • LIPID PANEL  2020   • PAP SMEAR  2021   • MAMMOGRAM  2021   • COLONOSCOPY  2027   • TDAP/TD VACCINES (2 - Td) 2029   • HEPATITIS C SCREENING  Completed   • ZOSTER VACCINE  Completed       PMH, PSH, SocHx, FamHx, Allergies, and Medications: Reviewed and updated in the Visit Navigator.     Allergies   Allergen Reactions   • Corn-Containing Products      Cramps and diarrhea   • Mushroom Extract Complex Swelling     Throat swelling     Past Medical History:   Diagnosis Date   • Asthma     ALLERGY INDUCED    • Body piercing     EARS   • Bronchitis    • Cancer (CMS/HCC) 2017    basal cell carcinoma, NOSE    • Colonic polyp     REPORTS HAD A \"CARPET GROWTH THAT REQUIRED A PORTION OF HER COLON BE REMOVED\"   • History of bronchitis    • History of migraine    • Seasonal allergies    • Wears eyeglasses    • Wears glasses      Past Surgical History:   Procedure Laterality Date   • COLON RESECTION N/A 2016    Procedure: LOW ANTERIOR COLON RESECTION LAPAROSCOPIC  WITH " DAVINCI SI ROBOT VS EXTENDED LEFT COLECTOMY WITH TAP BLOCK;  Surgeon: Isra Melendez MD;  Location:  [facility];  Service:    • COLONOSCOPY      2016   • COLONOSCOPY N/A 12/22/2017    Procedure: COLONOSCOPY WITH BIOPSIES;  Surgeon: Tino Stapleton MD;  Location:  [facility];  Service:    • PROCTOSCOPY N/A 9/7/2016    Procedure: PROCTOSCOPY RIGID;  Surgeon: Isra Melendez MD;  Location:  [facility];  Service:    • SKIN CANCER EXCISION     • WISDOM TOOTH EXTRACTION       Social History     Socioeconomic History   • Marital status: Single     Spouse name: Not on file   • Number of children: Not on file   • Years of education: Not on file   • Highest education level: Not on file   Tobacco Use   • Smoking status: Never Smoker   • Smokeless tobacco: Never Used   Substance and Sexual Activity   • Alcohol use: No   • Drug use: No   • Sexual activity: Defer     Family History   Problem Relation Age of Onset   • Ovarian cancer Maternal Grandmother 64   • Breast cancer Maternal Aunt 86       Review of Systems  Review of Systems   Constitutional: Positive for fatigue. Negative for activity change, appetite change and fever.   HENT: Negative for congestion, ear discharge, ear pain and trouble swallowing.    Eyes: Negative for photophobia and visual disturbance.   Respiratory: Negative for cough and shortness of breath.    Cardiovascular: Negative for chest pain and palpitations.   Gastrointestinal: Negative for abdominal distention, abdominal pain, constipation, diarrhea, nausea and vomiting.   Endocrine: Negative.    Genitourinary: Negative for dysuria, hematuria and urgency.   Musculoskeletal: Positive for arthralgias. Negative for back pain, joint swelling and myalgias.   Skin: Negative for color change and rash.   Allergic/Immunologic: Negative.    Neurological: Negative for dizziness, weakness, light-headedness and headaches.   Hematological: Negative for adenopathy. Does not bruise/bleed easily.   Psychiatric/Behavioral:  "Negative for agitation, confusion and dysphoric mood. The patient is not nervous/anxious.        Vitals:    09/03/20 0917   BP: 110/70   Pulse: 60   Temp: 98 °F (36.7 °C)   SpO2: 99%       Objective   /70   Pulse 60   Temp 98 °F (36.7 °C) (Temporal)   Ht 167.6 cm (66\")   Wt 59.9 kg (132 lb)   LMP  (LMP Unknown) Comment: PATIENT REPORTS LMP 2-3 YEARS AGO. DENIES ANY HX OF ABLATION.  SpO2 99%   BMI 21.31 kg/m²     Physical Exam   Constitutional: She is oriented to person, place, and time. She appears well-developed and well-nourished. No distress.   HENT:   Nose: Nose normal.   Mouth/Throat: Oropharynx is clear and moist.   Eyes: Conjunctivae and EOM are normal. No scleral icterus.   Neck: No tracheal deviation present. No thyromegaly present.   Cardiovascular: Normal rate and regular rhythm. Exam reveals no friction rub.   No murmur heard.  Pulmonary/Chest: No respiratory distress. She has no wheezes. She has no rales.   Abdominal: Soft. She exhibits distension. She exhibits no mass. There is no tenderness. There is no guarding.   Musculoskeletal: Normal range of motion. She exhibits deformity.   Lymphadenopathy:     She has no cervical adenopathy.   Neurological: She is alert and oriented to person, place, and time. She has normal reflexes. No cranial nerve deficit. Coordination normal.   Skin: Skin is warm and dry. No rash noted. No erythema.   Psychiatric: She has a normal mood and affect. Her behavior is normal. Judgment and thought content normal.       The 10-year CVD risk score (D'Agostino, et al., 2008) is: 3.1%    Values used to calculate the score:      Age: 55 years      Sex: Female      Diabetic: No      Tobacco smoker: No      Systolic Blood Pressure: 110 mmHg      Is BP treated: No      HDL Cholesterol: 62 mg/dL      Total Cholesterol: 178 mg/dL    Lab Results   Component Value Date    CHLPL 178 08/30/2019    TRIG 63 08/30/2019    HDL 62 (H) 08/30/2019     (H) 08/30/2019     Glucose "   Date Value Ref Range Status   09/19/2016 79 70 - 100 mg/dL Final     BUN   Date Value Ref Range Status   08/30/2019 9 6 - 20 mg/dL Final   09/19/2016 14 9 - 23 mg/dL Final     Creatinine   Date Value Ref Range Status   08/30/2019 0.74 0.57 - 1.00 mg/dL Final   09/19/2016 0.60 0.60 - 1.30 mg/dL Final     Sodium   Date Value Ref Range Status   08/30/2019 139 136 - 145 mmol/L Final   09/19/2016 137 132 - 146 mmol/L Final     Potassium   Date Value Ref Range Status   08/30/2019 4.7 3.5 - 5.2 mmol/L Final   09/19/2016 4.6 3.5 - 5.5 mmol/L Final     Chloride   Date Value Ref Range Status   08/30/2019 100 98 - 107 mmol/L Final   09/19/2016 98 (L) 99 - 109 mmol/L Final     CO2   Date Value Ref Range Status   09/19/2016 30.0 20.0 - 31.0 mmol/L Final     Total CO2   Date Value Ref Range Status   08/30/2019 27.3 22.0 - 29.0 mmol/L Final     Calcium   Date Value Ref Range Status   08/30/2019 9.3 8.6 - 10.5 mg/dL Final   09/19/2016 9.6 8.7 - 10.4 mg/dL Final     Total Protein   Date Value Ref Range Status   09/19/2016 8.0 5.7 - 8.2 g/dL Final     Albumin   Date Value Ref Range Status   08/30/2019 4.30 3.50 - 5.20 g/dL Final   09/19/2016 4.30 3.20 - 4.80 g/dL Final     ALT (SGPT)   Date Value Ref Range Status   08/30/2019 14 1 - 33 U/L Final   09/19/2016 19 7 - 40 U/L Final     AST (SGOT)   Date Value Ref Range Status   08/30/2019 19 1 - 32 U/L Final   09/19/2016 21 0 - 33 U/L Final     Alkaline Phosphatase   Date Value Ref Range Status   08/30/2019 79 39 - 117 U/L Final   09/19/2016 103 (H) 25 - 100 U/L Final     Total Bilirubin   Date Value Ref Range Status   08/30/2019 0.7 0.2 - 1.2 mg/dL Final   09/19/2016 0.5 0.3 - 1.2 mg/dL Final     eGFR Non  Am   Date Value Ref Range Status   08/30/2019 81 >60 mL/min/1.73 Final     eGFR Non  Amer   Date Value Ref Range Status   09/19/2016 105 >60 mL/min/1.73 Final     A/G Ratio   Date Value Ref Range Status   08/30/2019 1.4 g/dL Final     BUN/Creatinine Ratio   Date Value  Ref Range Status   08/30/2019 12.2 7.0 - 25.0 Final   09/19/2016 23.3 7.0 - 25.0 Final     Anion Gap   Date Value Ref Range Status   09/19/2016 9.0 3.0 - 11.0 mmol/L Final     Lab Results   Component Value Date    WBC 5.43 09/20/2019    HGB 14.9 09/20/2019    HCT 45.5 09/20/2019    MCV 90.5 09/20/2019     (L) 09/20/2019        Assessment/Plan   1. Healthy female exam.   2. Patient Counseling: Including but not Limited to the following, when appropriate:  --Nutrition: Stressed importance of moderation in sodium/caffeine intake, saturated fat and cholesterol, caloric balance, sufficient intake of fresh fruits, vegetables, fiber, calcium, iron, and 1 mg of folate supplement per day (for females capable of pregnancy).  --Discussed the issue of estrogen replacement, calcium supplement, and the daily use of baby aspirin.  --Exercise: Stressed the importance of regular exercise.   --Substance Abuse: Discussed cessation/primary prevention of tobacco, alcohol, or other drug use; driving or other dangerous activities under the influence; availability of treatment for abuse, as indicated based on social history.    --Sexuality: Discussed sexually transmitted diseases, partner selection, use of condoms, avoidance of unintended pregnancy  and contraceptive alternatives.   --Injury prevention: Discussed safety belts, safety helmets, smoke detector, smoking near bedding or upholstery.   --Dental health: Discussed importance of regular tooth brushing, flossing, and dental visits.  --Immunizations reviewed.  --Discussed benefits of colon cancer screening.      3. Discussed the patient's BMI with her.  The BMI is in the acceptable range  4. No follow-ups on file.  5. Age-appropriate Screening Scheduled  6. There are no Patient Instructions on file for this visit.    Assessment/Plan     Jeni was seen today for annual exam.    Diagnoses and all orders for this visit:    Need for vaccination  -     Fluarix Quad >6 Months (VFC)  ([id number])    Routine general medical examination at a [facility] facility  Patient with complains of increased anxiety and sleep disturbances.  Appears to have had a flareup of her irritable bowel syndrome.  See orders below  Other orders  -     escitalopram (LEXAPRO) 5 MG tablet; Take 1 tablet by mouth Daily.

## 2020-09-04 DIAGNOSIS — Z20.822 EXPOSURE TO COVID-19 VIRUS: Primary | ICD-10-CM

## 2020-09-26 LAB — SARS-COV-2 AB SERPL QL IA: NEGATIVE

## 2020-10-15 ENCOUNTER — OFFICE VISIT (OUTPATIENT)
Dept: INTERNAL MEDICINE | Facility: CLINIC | Age: 55
End: 2020-10-15

## 2020-10-15 VITALS
DIASTOLIC BLOOD PRESSURE: 75 MMHG | OXYGEN SATURATION: 98 % | HEART RATE: 67 BPM | BODY MASS INDEX: 20.73 KG/M2 | SYSTOLIC BLOOD PRESSURE: 111 MMHG | WEIGHT: 129 LBS | TEMPERATURE: 97.5 F | HEIGHT: 66 IN | RESPIRATION RATE: 16 BRPM

## 2020-10-15 DIAGNOSIS — K59.1 FUNCTIONAL DIARRHEA: ICD-10-CM

## 2020-10-15 DIAGNOSIS — F41.9 ANXIETY: Primary | ICD-10-CM

## 2020-10-15 PROCEDURE — 99213 OFFICE O/P EST LOW 20 MIN: CPT | Performed by: INTERNAL MEDICINE

## 2020-10-15 RX ORDER — IVERMECTIN 10 MG/G
1 CREAM TOPICAL NIGHTLY
COMMUNITY
Start: 2020-10-07 | End: 2021-09-09

## 2020-10-15 RX ORDER — FLUOCINOLONE ACETONIDE 0.1 MG/ML
1 SOLUTION TOPICAL DAILY
COMMUNITY
Start: 2020-10-07 | End: 2021-09-09

## 2020-10-15 RX ORDER — KETOCONAZOLE 20 MG/ML
1 SHAMPOO TOPICAL EVERY OTHER DAY
COMMUNITY
Start: 2020-10-07 | End: 2021-09-09

## 2020-10-15 NOTE — PROGRESS NOTES
Subjective  Jeni Newby is a 55 y.o. female    HPI coming in for follow-up patient with anxiety with depression for which she was placed on a very low-dose of Lexapro at 2.5 mg daily.  She complains of significant sexual dysfunction and loss of libido feels that she is not enjoying things as she used to before.  There is no alcohol or illicit drug use.  She is requesting that she would like to get off this medication.  She did have some improvement in her sleep while on Lexapro which she started about a month ago  History of intermittent diarrhea without significant weight loss colonoscopy was unremarkable    The following portions of the patient's history were reviewed and updated as appropriate: allergies, current medications, past family history, past medical history, past social history, past surgical history, and problem list.     Review of Systems   Constitutional: Negative.  Negative for activity change, appetite change, fatigue and fever.   HENT: Negative for congestion, ear discharge, ear pain and trouble swallowing.    Eyes: Negative for photophobia and visual disturbance.   Respiratory: Negative for cough and shortness of breath.    Cardiovascular: Negative for chest pain and palpitations.   Gastrointestinal: Negative for abdominal distention, abdominal pain, constipation, diarrhea, nausea and vomiting.   Endocrine: Negative.    Genitourinary: Negative for dysuria, hematuria and urgency.   Musculoskeletal: Negative for arthralgias, back pain, joint swelling and myalgias.   Skin: Negative for color change and rash.   Allergic/Immunologic: Negative.    Neurological: Negative for dizziness, weakness, light-headedness and headaches.   Hematological: Negative for adenopathy. Does not bruise/bleed easily.   Psychiatric/Behavioral: Positive for sleep disturbance. Negative for agitation, confusion and dysphoric mood. The patient is nervous/anxious.        Visit Vitals  /75   Pulse 67   Temp 97.5 °F  "(36.4 °C)   Resp 16   Ht 167.6 cm (65.98\")   Wt 58.5 kg (129 lb)   LMP  (LMP Unknown) Comment: PATIENT REPORTS LMP 2-3 YEARS AGO. DENIES ANY HX OF ABLATION.   SpO2 98%   BMI 20.83 kg/m²       Objective  Physical Exam  Constitutional:       General: She is not in acute distress.     Appearance: She is well-developed.   HENT:      Nose: Nose normal.   Eyes:      General: No scleral icterus.     Conjunctiva/sclera: Conjunctivae normal.   Neck:      Thyroid: No thyromegaly.      Trachea: No tracheal deviation.   Cardiovascular:      Rate and Rhythm: Normal rate and regular rhythm.      Heart sounds: No murmur. No friction rub.   Pulmonary:      Effort: No respiratory distress.      Breath sounds: No wheezing or rales.   Abdominal:      General: There is no distension.      Palpations: Abdomen is soft. There is no mass.      Tenderness: There is no abdominal tenderness. There is no guarding.   Musculoskeletal: Normal range of motion.         General: No deformity.   Lymphadenopathy:      Cervical: No cervical adenopathy.   Skin:     General: Skin is warm and dry.      Findings: No erythema or rash.   Neurological:      Mental Status: She is alert and oriented to person, place, and time.      Cranial Nerves: No cranial nerve deficit.      Coordination: Coordination normal.      Deep Tendon Reflexes: Reflexes are normal and symmetric.   Psychiatric:         Behavior: Behavior normal.         Thought Content: Thought content normal.         Judgment: Judgment normal.         Diagnoses and all orders for this visit:    Anxiety counseled patient in detail discussed sleep hygiene will discontinue Lexapro    Functional diarrhea history suggestive of irritable bowel syndrome has responded well to Imodium in the past will continue this as needed    Other orders  -     fluocinolone (SYNALAR) 0.01 % external solution; Apply 1 application topically to the appropriate area as directed Daily.  -     Soolantra 1 % cream; Apply 1 " application topically to the appropriate area as directed Every Night.  -     ketoconazole (NIZORAL) 2 % shampoo; Apply 1 application topically to the appropriate area as directed Every Other Day.  -     Glucosamine-Chondroit-Vit C-Mn (GLUCOSAMINE 1500 COMPLEX PO); Take 1 tablet by mouth Daily.

## 2020-10-16 LAB
ALBUMIN SERPL-MCNC: 4.5 G/DL (ref 3.5–5.2)
ALBUMIN/GLOB SERPL: 1.9 G/DL
ALP SERPL-CCNC: 73 U/L (ref 39–117)
ALT SERPL-CCNC: 13 U/L (ref 1–33)
AST SERPL-CCNC: 21 U/L (ref 1–32)
BILIRUB SERPL-MCNC: 0.4 MG/DL (ref 0–1.2)
BUN SERPL-MCNC: 12 MG/DL (ref 6–20)
BUN/CREAT SERPL: 15 (ref 7–25)
CALCIUM SERPL-MCNC: 9.6 MG/DL (ref 8.6–10.5)
CHLORIDE SERPL-SCNC: 104 MMOL/L (ref 98–107)
CO2 SERPL-SCNC: 27.8 MMOL/L (ref 22–29)
CREAT SERPL-MCNC: 0.8 MG/DL (ref 0.57–1)
ERYTHROCYTE [DISTWIDTH] IN BLOOD BY AUTOMATED COUNT: 12.5 % (ref 12.3–15.4)
GLOBULIN SER CALC-MCNC: 2.4 GM/DL
GLUCOSE SERPL-MCNC: 77 MG/DL (ref 65–99)
HBA1C MFR BLD: 4.9 % (ref 4.8–5.6)
HCT VFR BLD AUTO: 44.7 % (ref 34–46.6)
HGB BLD-MCNC: 14.7 G/DL (ref 12–15.9)
LDLC SERPL DIRECT ASSAY-MCNC: 118 MG/DL (ref 0–99)
MCH RBC QN AUTO: 29.4 PG (ref 26.6–33)
MCHC RBC AUTO-ENTMCNC: 32.9 G/DL (ref 31.5–35.7)
MCV RBC AUTO: 89.4 FL (ref 79–97)
PLATELET # BLD AUTO: 123 10*3/MM3 (ref 140–450)
POTASSIUM SERPL-SCNC: 4.2 MMOL/L (ref 3.5–5.2)
PROT SERPL-MCNC: 6.9 G/DL (ref 6–8.5)
RBC # BLD AUTO: 5 10*6/MM3 (ref 3.77–5.28)
SODIUM SERPL-SCNC: 141 MMOL/L (ref 136–145)
WBC # BLD AUTO: 4.23 10*3/MM3 (ref 3.4–10.8)

## 2020-11-25 ENCOUNTER — OFFICE VISIT (OUTPATIENT)
Dept: INTERNAL MEDICINE | Facility: CLINIC | Age: 55
End: 2020-11-25

## 2020-11-25 VITALS
HEART RATE: 78 BPM | DIASTOLIC BLOOD PRESSURE: 68 MMHG | OXYGEN SATURATION: 99 % | WEIGHT: 132.8 LBS | HEIGHT: 66 IN | BODY MASS INDEX: 21.34 KG/M2 | SYSTOLIC BLOOD PRESSURE: 110 MMHG | TEMPERATURE: 97.7 F

## 2020-11-25 DIAGNOSIS — K59.00 CONSTIPATION, UNSPECIFIED CONSTIPATION TYPE: Primary | ICD-10-CM

## 2020-11-25 DIAGNOSIS — K63.9 LESION OF COLON: ICD-10-CM

## 2020-11-25 PROCEDURE — 99214 OFFICE O/P EST MOD 30 MIN: CPT | Performed by: NURSE PRACTITIONER

## 2020-11-25 RX ORDER — POLYETHYLENE GLYCOL 3350 17 G/17G
17 POWDER, FOR SOLUTION ORAL DAILY
Qty: 30 EACH | Refills: 0 | Status: SHIPPED | OUTPATIENT
Start: 2020-11-25 | End: 2021-09-09

## 2020-11-25 NOTE — PROGRESS NOTES
"Date: 2020    Name: Jeni Newby  : 1965    Chief Complaint:   Chief Complaint   Patient presents with   • Constipation     pt states she has not had a bowel movement since last Thursday, states she had a small one last night       HPI:  Jeni Newby is a 55 y.o. female presents with a 6-day history of constipation.  Normally has frequent diarrhea that occasionally wakes her up from sleep.  No change in diet or activity.  She did note a sharp epigastric pain the evening prior to onset of constipation.  She has tried yoga twists, hot herbal tea, high fiber cereal (recommended by Dr Stapleton), protein shakes, lying on her left side, abdominal massage without relief.  Stays well hydrated with 64 oz of water daily.  Called clinic yesterday, was advised to take MiraLAX (sample given).  Had a small bowel movement in the night. She is passing flatus.  No abdominal pain, nausea, vomiting, lower back pain, excessive belching, blood in stool, decreased appetite.  She has a h/o colon polyps, large colonic lesion resection.      History:  The following portions of the patient's history were reviewed and updated as appropriate: allergies, current medications, past medical history, family history, surgical history, social history and problem list.     ROS:  Review of Systems   Constitutional: Negative.    Respiratory: Negative.    Cardiovascular: Negative.    Gastrointestinal: Negative for abdominal distention, blood in stool and indigestion.   Neurological: Negative for dizziness and headache.       VS:  Vitals:    20 0902   BP: 110/68   Pulse: 78   Temp: 97.7 °F (36.5 °C)   TempSrc: Infrared   SpO2: 99%   Weight: 60.2 kg (132 lb 12.8 oz)   Height: 167.6 cm (65.98\")     Body mass index is 21.45 kg/m².    PE:  Physical Exam  Constitutional:       Appearance: She is not ill-appearing.   HENT:      Head: Normocephalic.      Right Ear: External ear normal.      Left Ear: External ear normal.   Eyes:      " Conjunctiva/sclera: Conjunctivae normal.      Pupils: Pupils are equal, round, and reactive to light.   Neck:      Musculoskeletal: Normal range of motion and neck supple.   Cardiovascular:      Rate and Rhythm: Normal rate and regular rhythm.      Pulses: Normal pulses.      Heart sounds: Normal heart sounds.   Pulmonary:      Effort: Pulmonary effort is normal.      Breath sounds: Normal breath sounds.   Abdominal:      General: Abdomen is flat. Bowel sounds are increased.      Palpations: Abdomen is soft.      Tenderness: There is no abdominal tenderness. There is no guarding or rebound. Negative signs include Davila's sign, McBurney's sign and psoas sign.   Skin:     General: Skin is warm.      Capillary Refill: Capillary refill takes less than 2 seconds.      Comments: Normal turgor   Neurological:      Mental Status: She is alert and oriented to person, place, and time.      Coordination: Coordination normal.      Gait: Gait normal.   Psychiatric:         Mood and Affect: Mood normal.         Behavior: Behavior normal.         Thought Content: Thought content normal.         Assessment/Plan:  Diagnoses and all orders for this visit:    1. Constipation, unspecified constipation type (Primary)  -     polyethylene glycol (MIRALAX) 17 g packet; Take 17 g by mouth Daily.  Dispense: 30 each; Refill: 0  - Patient feels reassured after having small bowel movement last night.  Further reassured that she is passing flatus.  - Advised to drink 8 oz of prune juice with one dose of milk of magnesia today.    - Continue to stay well hydrated.  Avoid tea with caffeine.  Continue to stay active.      2. Lesion of colon        Return for Next scheduled follow up.

## 2020-12-18 ENCOUNTER — HOSPITAL ENCOUNTER (OUTPATIENT)
Dept: MAMMOGRAPHY | Facility: HOSPITAL | Age: 55
Discharge: HOME | End: 2020-12-18
Admitting: OBSTETRICS & GYNECOLOGY

## 2020-12-18 ENCOUNTER — OFFICE VISIT (OUTPATIENT)
Dept: OBSTETRICS AND GYNECOLOGY | Facility: CLINIC | Age: 55
End: 2020-12-18

## 2020-12-18 VITALS — DIASTOLIC BLOOD PRESSURE: 80 MMHG | BODY MASS INDEX: 21.8 KG/M2 | WEIGHT: 135 LBS | SYSTOLIC BLOOD PRESSURE: 116 MMHG

## 2020-12-18 DIAGNOSIS — Z12.31 VISIT FOR SCREENING MAMMOGRAM: ICD-10-CM

## 2020-12-18 DIAGNOSIS — Z01.419 WOMEN'S ANNUAL ROUTINE GYNECOLOGICAL EXAMINATION: Primary | ICD-10-CM

## 2020-12-18 PROCEDURE — 77067 SCR MAMMO BI INCL CAD: CPT

## 2020-12-18 PROCEDURE — 99396 PREV VISIT EST AGE 40-64: CPT | Performed by: OBSTETRICS & GYNECOLOGY

## 2020-12-18 PROCEDURE — 77063 BREAST TOMOSYNTHESIS BI: CPT | Performed by: RADIOLOGY

## 2020-12-18 PROCEDURE — 77067 SCR MAMMO BI INCL CAD: CPT | Performed by: RADIOLOGY

## 2020-12-18 PROCEDURE — 77063 BREAST TOMOSYNTHESIS BI: CPT

## 2020-12-18 NOTE — PROGRESS NOTES
GYN Annual Exam     CC - Here for annual exam.     Subjective   HPI  Jeni Newby is a 55 y.o. female, , who presents for annual well woman exam.  She is postmenopausal. Her last LMP was No LMP recorded (lmp unknown). Patient is postmenopausal..    Patient reports problems with: none.  Partner Status: Marital Status: single.  New Partners since last visit: no Desires STD Screening: no    Last mammogram: pending  Last Completed Mammogram       Status Date      MAMMOGRAM Done 2020      Patient has more history with this topic...        Last colonoscopy:   Last Completed Colonoscopy       Status Date      COLONOSCOPY Done 2017 SCANNED - COLONOSCOPY     Patient has more history with this topic...        Last DEXA: 2018   Last Pap : 2019  Last Completed Pap Smear       Status Date      PAP SMEAR Done 2018 SCANNED - PAP SMEAR     Patient has more history with this topic...        History of abnormal Pap smear: no    Additional OB/GYN History   Current contraception: contraceptive methods: Post menopausal status  Desires to: continue contraception  Family history of uterine, colon, breast, or ovarian cancer: yes - [facility]  Performs monthly Self-Breast Exam: no  Parental Hip Fracture: no  Exercises Regularly: no  Feelings of Anxiety or Depression: no    Tobacco Usage?: No   OB History        0    Para   0    Term   0       0    AB   0    Living   0       SAB   0    TAB   0    Ectopic   0    Molar   0    Multiple   0    Live Births   0                Health Maintenance   Topic Date Due   • Annual Gynecologic Pelvic and Breast Exam  2019   • ANNUAL PHYSICAL  2021   • PAP SMEAR  2021   • LIPID PANEL  10/15/2021   • MAMMOGRAM  2022   • COLONOSCOPY  2027   • TDAP/TD VACCINES (2 - Td) 2029   • HEPATITIS C SCREENING  Completed   • INFLUENZA VACCINE  Completed   • ZOSTER VACCINE  Completed   • Pneumococcal Vaccine 0-64  Aged Out       The additional  following portions of the patient's history were reviewed and updated as appropriate: allergies, current medications, past family history, past medical history, past social history, past surgical history and problem list.    Review of Systems   Constitutional: Negative.    HENT: Negative.    Eyes: Negative.    Respiratory: Negative.    Cardiovascular: Negative.    Gastrointestinal: Negative.    Endocrine: Negative.    Genitourinary: Negative.    Musculoskeletal: Negative.    Skin: Negative.    Allergic/Immunologic: Negative.    Neurological: Negative.    Hematological: Negative.    Psychiatric/Behavioral: Negative.        I have reviewed and agree with the HPI, ROS, and historical information as entered above. Stan Sal MD    Objective   /80   Wt 61.2 kg (135 lb)   LMP  (LMP Unknown) Comment: PATIENT REPORTS LMP 2-3 YEARS AGO. DENIES ANY HX OF ABLATION.  BMI 21.80 kg/m²     Physical Exam  Vitals signs and nursing note reviewed. Exam conducted with a chaperone present.   Constitutional:       Appearance: She is well-developed.   HENT:      Head: Normocephalic and atraumatic.   Neck:      Musculoskeletal: Normal range of motion. No muscular tenderness.      Thyroid: No thyroid mass or thyromegaly.   Cardiovascular:      Rate and Rhythm: Normal rate and regular rhythm.      Heart sounds: No murmur.   Pulmonary:      Effort: Pulmonary effort is normal. No retractions.      Breath sounds: Normal breath sounds. No wheezing, rhonchi or rales.   Chest:      Chest wall: No mass or tenderness.      Breasts:         Right: Normal. No mass, nipple discharge, skin change or tenderness.         Left: Normal. No mass, nipple discharge, skin change or tenderness.   Abdominal:      General: Bowel sounds are normal.      Palpations: Abdomen is soft. Abdomen is not rigid. There is no mass.      Tenderness: There is no abdominal tenderness. There is no guarding.      Hernia: No hernia is present. There is no hernia in the  left inguinal area.   Genitourinary:     Labia:         Right: No rash, tenderness or lesion.         Left: No rash, tenderness or lesion.       Vagina: Normal. No vaginal discharge or lesions.      Cervix: No cervical motion tenderness, discharge, lesion or cervical bleeding.      Uterus: Normal. Not enlarged, not fixed and not tender.       Adnexa:         Right: No mass or tenderness.          Left: No mass or tenderness.        Rectum: No external hemorrhoid.   Neurological:      Mental Status: She is alert and oriented to person, place, and time.   Psychiatric:         Behavior: Behavior normal.           Assessment/Plan     Encounter Diagnosis   Name Primary?   • Women's annual routine gynecological examination Yes       Recommended use of Vitamin D replacement and getting adequate calcium in her diet. (1500mg)  Reviewed monthly self breast exams.  Instructed to call with lumps, pain, or breast discharge.    Continue yearly mammography  Reviewed HPV guidelines.  Reviewed exercise as a preventative health measures.   Reviewed risks/benefits of ERT including the lack of evidence estrogen alone increases the risk of breast cancer or stroke and decreases risk of hip fracture, colon cancer and all cause mortality.  However there is concern slight increase risk of pulmonary embolism.   Patient strongly desires to stay on or start HRT.  She understands she will use the lowest dose that adequately controls her symptoms.    Stan Sal MD   12/18/2020

## 2020-12-21 ENCOUNTER — TRANSCRIBE ORDERS (OUTPATIENT)
Dept: ADMINISTRATIVE | Facility: HOSPITAL | Age: 55
End: 2020-12-21

## 2020-12-21 DIAGNOSIS — Z12.31 VISIT FOR SCREENING MAMMOGRAM: Primary | ICD-10-CM

## 2020-12-23 DIAGNOSIS — Z01.419 WOMEN'S ANNUAL ROUTINE GYNECOLOGICAL EXAMINATION: ICD-10-CM

## 2021-09-09 ENCOUNTER — OFFICE VISIT (OUTPATIENT)
Dept: INTERNAL MEDICINE | Facility: CLINIC | Age: 56
End: 2021-09-09

## 2021-09-09 VITALS
HEIGHT: 66 IN | HEART RATE: 80 BPM | BODY MASS INDEX: 21.44 KG/M2 | TEMPERATURE: 98.2 F | WEIGHT: 133.4 LBS | SYSTOLIC BLOOD PRESSURE: 100 MMHG | DIASTOLIC BLOOD PRESSURE: 60 MMHG | OXYGEN SATURATION: 98 %

## 2021-09-09 DIAGNOSIS — Z00.00 ROUTINE GENERAL MEDICAL EXAMINATION AT A HEALTH CARE FACILITY: Primary | ICD-10-CM

## 2021-09-09 DIAGNOSIS — G57.62 MORTON'S NEUROMA OF LEFT FOOT: ICD-10-CM

## 2021-09-09 PROCEDURE — 99396 PREV VISIT EST AGE 40-64: CPT | Performed by: INTERNAL MEDICINE

## 2021-09-09 RX ORDER — BUDESONIDE AND FORMOTEROL FUMARATE DIHYDRATE 80; 4.5 UG/1; UG/1
2 AEROSOL RESPIRATORY (INHALATION)
COMMUNITY
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
## 2021-09-10 LAB
ALBUMIN SERPL-MCNC: 4.6 G/DL (ref 3.5–5.2)
ALBUMIN/GLOB SERPL: 1.9 G/DL
ALP SERPL-CCNC: 69 U/L (ref 39–117)
ALT SERPL-CCNC: 12 U/L (ref 1–33)
AST SERPL-CCNC: 19 U/L (ref 1–32)
BASOPHILS # BLD AUTO: 0.02 10*3/MM3 (ref 0–0.2)
BASOPHILS NFR BLD AUTO: 0.5 % (ref 0–1.5)
BILIRUB SERPL-MCNC: 0.4 MG/DL (ref 0–1.2)
BUN SERPL-MCNC: 14 MG/DL (ref 6–20)
BUN/CREAT SERPL: 19.7 (ref 7–25)
CALCIUM SERPL-MCNC: 9.6 MG/DL (ref 8.6–10.5)
CHLORIDE SERPL-SCNC: 105 MMOL/L (ref 98–107)
CO2 SERPL-SCNC: 26.4 MMOL/L (ref 22–29)
CREAT SERPL-MCNC: 0.71 MG/DL (ref 0.57–1)
EOSINOPHIL # BLD AUTO: 0.01 10*3/MM3 (ref 0–0.4)
EOSINOPHIL NFR BLD AUTO: 0.2 % (ref 0.3–6.2)
ERYTHROCYTE [DISTWIDTH] IN BLOOD BY AUTOMATED COUNT: 12.5 % (ref 12.3–15.4)
GLOBULIN SER CALC-MCNC: 2.4 GM/DL
GLUCOSE SERPL-MCNC: 81 MG/DL (ref 65–99)
HCT VFR BLD AUTO: 45 % (ref 34–46.6)
HGB BLD-MCNC: 14.8 G/DL (ref 12–15.9)
IMM GRANULOCYTES # BLD AUTO: 0.03 10*3/MM3 (ref 0–0.05)
IMM GRANULOCYTES NFR BLD AUTO: 0.7 % (ref 0–0.5)
LDLC SERPL DIRECT ASSAY-MCNC: 117 MG/DL (ref 0–99)
LYMPHOCYTES # BLD AUTO: 0.95 10*3/MM3 (ref 0.7–3.1)
LYMPHOCYTES NFR BLD AUTO: 21.9 % (ref 19.6–45.3)
MCH RBC QN AUTO: 29.5 PG (ref 26.6–33)
MCHC RBC AUTO-ENTMCNC: 32.9 G/DL (ref 31.5–35.7)
MCV RBC AUTO: 89.8 FL (ref 79–97)
MONOCYTES # BLD AUTO: 0.23 10*3/MM3 (ref 0.1–0.9)
MONOCYTES NFR BLD AUTO: 5.3 % (ref 5–12)
NEUTROPHILS # BLD AUTO: 3.09 10*3/MM3 (ref 1.7–7)
NEUTROPHILS NFR BLD AUTO: 71.4 % (ref 42.7–76)
PLATELET # BLD AUTO: 125 10*3/MM3 (ref 140–450)
POTASSIUM SERPL-SCNC: 4.5 MMOL/L (ref 3.5–5.2)
PROT SERPL-MCNC: 7 G/DL (ref 6–8.5)
RBC # BLD AUTO: 5.01 10*6/MM3 (ref 3.77–5.28)
SODIUM SERPL-SCNC: 143 MMOL/L (ref 136–145)
VIT B12 SERPL-MCNC: 1309 PG/ML (ref 211–946)
WBC # BLD AUTO: 4.33 10*3/MM3 (ref 3.4–10.8)

## 2021-09-22 ENCOUNTER — TELEPHONE (OUTPATIENT)
Dept: URGENT CARE | Facility: CLINIC | Age: 56
End: 2021-09-22

## 2021-09-22 NOTE — TELEPHONE ENCOUNTER
Radiologist read xray as probable dextrocardia (heart on right side of chest);  Mild degenerative changes L5-S1  Other findings as discussed at office visit

## 2021-09-23 RX ORDER — FLUOCINONIDE TOPICAL SOLUTION USP, 0.05% 0.5 MG/ML
SOLUTION TOPICAL DAILY
Qty: 60 ML | Refills: 2 | Status: SHIPPED | OUTPATIENT
Start: 2021-09-23 | End: 2021-11-10

## 2021-09-27 RX ORDER — TRIAMCINOLONE ACETONIDE 0.25 MG/ML
1 LOTION TOPICAL DAILY
Qty: 60 ML | Refills: 5 | Status: SHIPPED | OUTPATIENT
Start: 2021-09-27 | End: 2021-11-10

## 2021-09-29 ENCOUNTER — FLU SHOT (OUTPATIENT)
Dept: INTERNAL MEDICINE | Facility: CLINIC | Age: 56
End: 2021-09-29

## 2021-09-29 DIAGNOSIS — Z23 FLU VACCINE NEED: Primary | ICD-10-CM

## 2021-09-29 PROCEDURE — 90471 IMMUNIZATION ADMIN: CPT | Performed by: INTERNAL MEDICINE

## 2021-09-29 PROCEDURE — 90686 IIV4 VACC NO PRSV 0.5 ML IM: CPT | Performed by: INTERNAL MEDICINE

## 2021-10-15 ENCOUNTER — IMMUNIZATION (OUTPATIENT)
Dept: INTERNAL MEDICINE | Facility: CLINIC | Age: 56
End: 2021-10-15

## 2021-10-15 DIAGNOSIS — Z23 IMMUNIZATION DUE: Primary | ICD-10-CM

## 2021-10-15 PROCEDURE — 91300 COVID-19 (PFIZER): CPT | Performed by: INTERNAL MEDICINE

## 2021-10-15 PROCEDURE — 0003A COVID-19 (PFIZER): CPT | Performed by: INTERNAL MEDICINE

## 2021-11-10 ENCOUNTER — OFFICE VISIT (OUTPATIENT)
Dept: ORTHOPEDIC SURGERY | Facility: CLINIC | Age: 56
End: 2021-11-10

## 2021-11-10 VITALS
DIASTOLIC BLOOD PRESSURE: 78 MMHG | HEIGHT: 66 IN | BODY MASS INDEX: 21.05 KG/M2 | OXYGEN SATURATION: 99 % | WEIGHT: 131 LBS | SYSTOLIC BLOOD PRESSURE: 110 MMHG | HEART RATE: 64 BPM

## 2021-11-10 DIAGNOSIS — G57.62 MORTON'S NEUROMA OF LEFT FOOT: ICD-10-CM

## 2021-11-10 DIAGNOSIS — M79.672 LEFT FOOT PAIN: Primary | ICD-10-CM

## 2021-11-10 PROCEDURE — 64455 NJX AA&/STRD PLTR COM DG NRV: CPT | Performed by: ORTHOPAEDIC SURGERY

## 2021-11-10 PROCEDURE — 99203 OFFICE O/P NEW LOW 30 MIN: CPT | Performed by: ORTHOPAEDIC SURGERY

## 2021-11-10 RX ORDER — METHYLPREDNISOLONE ACETATE 40 MG/ML
40 INJECTION, SUSPENSION INTRA-ARTICULAR; INTRALESIONAL; INTRAMUSCULAR; SOFT TISSUE
Status: COMPLETED | OUTPATIENT
Start: 2021-11-10 | End: 2021-11-10

## 2021-11-10 RX ORDER — BUPIVACAINE HYDROCHLORIDE 2.5 MG/ML
1 INJECTION, SOLUTION EPIDURAL; INFILTRATION; INTRACAUDAL
Status: COMPLETED | OUTPATIENT
Start: 2021-11-10 | End: 2021-11-10

## 2021-11-10 RX ADMIN — METHYLPREDNISOLONE ACETATE 40 MG: 40 INJECTION, SUSPENSION INTRA-ARTICULAR; INTRALESIONAL; INTRAMUSCULAR; SOFT TISSUE at 11:17

## 2021-11-10 RX ADMIN — BUPIVACAINE HYDROCHLORIDE 1 ML: 2.5 INJECTION, SOLUTION EPIDURAL; INFILTRATION; INTRACAUDAL at 11:17

## 2021-11-10 NOTE — PROGRESS NOTES
Procedure   Left foot Castro's neuroma cortisone injection  Consent given by: patient  Site marked: site marked  Timeout: Immediately prior to procedure a time out was called to verify the correct patient, procedure, equipment, support staff and site/side marked as required   Supporting Documentation  Indications: pain   Procedure Details  Location: foot - Foot joint: Left foot castro's neuroma.  Preparation: Patient was prepped and draped in the usual sterile fashion  Needle size: 25 G  Approach: dorsal  Medications administered: 40 mg methylPREDNISolone acetate 40 MG/ML; 1 mL bupivacaine (PF) 0.25 %  Patient tolerance: patient tolerated the procedure well with no immediate complications
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
## 2021-11-12 ENCOUNTER — TELEPHONE (OUTPATIENT)
Dept: ORTHOPEDIC SURGERY | Facility: CLINIC | Age: 56
End: 2021-11-12

## 2021-11-12 NOTE — TELEPHONE ENCOUNTER
Patient called in with complaints of extreme pain following her cortisone injection on Wednesday; She says she wasn't having any pain before the injection but now the pain is excruciating and she is having a hard time weightbearing and is nauseous. I explained that sometimes you can have increased pain following a cortisone injection and that until the cortisone kicks in, the pain could get worse before it gets better; I told her to rest, ice, and take NSAIDs and give it a few days; Told her to let me know Monday morning if the pain hasn't let up. Also advised she watch for any increase in redness, warmth, swelling, or fever, chills, etc. And to call back if she did develop any of those symptoms. She understood.    [place]

## 2021-11-15 ENCOUNTER — OFFICE VISIT (OUTPATIENT)
Dept: GASTROENTEROLOGY | Facility: CLINIC | Age: 56
End: 2021-11-15

## 2021-11-15 VITALS
TEMPERATURE: 98 F | SYSTOLIC BLOOD PRESSURE: 100 MMHG | HEIGHT: 66 IN | BODY MASS INDEX: 21.69 KG/M2 | DIASTOLIC BLOOD PRESSURE: 70 MMHG | WEIGHT: 135 LBS

## 2021-11-15 DIAGNOSIS — K58.0 IRRITABLE BOWEL SYNDROME WITH DIARRHEA: ICD-10-CM

## 2021-11-15 DIAGNOSIS — Z86.010 PERSONAL HISTORY OF COLONIC POLYPS: Primary | ICD-10-CM

## 2021-11-15 DIAGNOSIS — R19.7 DIARRHEA, UNSPECIFIED TYPE: ICD-10-CM

## 2021-11-15 PROCEDURE — 99203 OFFICE O/P NEW LOW 30 MIN: CPT | Performed by: INTERNAL MEDICINE

## 2021-11-15 RX ORDER — SODIUM CHLORIDE 9 MG/ML
70 INJECTION, SOLUTION INTRAVENOUS CONTINUOUS PRN
Status: CANCELLED | OUTPATIENT
Start: 2021-11-15

## 2021-11-15 NOTE — PATIENT INSTRUCTIONS
Low-FODMAP Eating Plan    FODMAP stands for fermentable oligosaccharides, disaccharides, monosaccharides, and polyols. These are sugars that are hard for some people to digest. A low-FODMAP eating plan may help some people who have irritable bowel syndrome (IBS) and certain other bowel (intestinal) diseases to manage their symptoms.  This meal plan can be complicated to follow. Work with a diet and nutrition specialist (dietitian) to make a low-FODMAP eating plan that is right for you. A dietitian can help make sure that you get enough nutrition from this diet.  What are tips for following this plan?  Reading food labels  · Check labels for hidden FODMAPs such as:  ? High-fructose syrup.  ? Honey.  ? Agave.  ? Natural fruit flavors.  ? Onion or garlic powder.  · Choose low-FODMAP foods that contain 3-4 grams of fiber per serving.  · Check food labels for serving sizes. Eat only one serving at a time to make sure FODMAP levels stay low.  Shopping  · Shop with a list of foods that are recommended on this diet and make a meal plan.  Meal planning  · Follow a low-FODMAP eating plan for up to 6 weeks, or as told by your health care provider or dietitian.  · To follow the eating plan:  1. Eliminate high-FODMAP foods from your diet completely. Choose only low-FODMAP foods to eat. You will do this for 2-6 weeks.  2. Gradually reintroduce high-FODMAP foods into your diet one at a time. Most people should wait a few days before introducing the next new high-FODMAP food into their meal plan. Your dietitian can recommend how quickly you may reintroduce foods.  3. Keep a daily record of what and how much you eat and drink. Make note of any symptoms that you have after eating.  4. Review your daily record with a dietitian regularly to identify which foods you can eat and which foods you should avoid.  General tips  · Drink enough fluid each day to keep your urine pale yellow.  · Avoid processed foods. These often have added sugar  "and may be high in FODMAPs.  · Avoid most dairy products, whole grains, and sweeteners.  · Work with a dietitian to make sure you get enough fiber in your diet.  · Avoid high FODMAP foods at meals to manage symptoms.  Recommended foods  Fruits  Bananas, oranges, tangerines, ross, limes, blueberries, raspberries, strawberries, grapes, cantaloupe, honeydew melon, kiwi, papaya, passion fruit, and pineapple. Limited amounts of dried cranberries, banana chips, and shredded coconut.  Vegetables  Eggplant, zucchini, cucumber, peppers, green beans, bean sprouts, lettuce, arugula, kale, Swiss chard, spinach, darby greens, bok josé miguel, summer squash, potato, and tomato. Limited amounts of corn, carrot, and sweet potato. Green parts of scallions.  Grains  Gluten-free grains, such as rice, oats, buckwheat, quinoa, corn, polenta, and millet. Gluten-free pasta, bread, or cereal. Rice noodles. Corn tortillas.  Meats and other proteins  Unseasoned beef, pork, poultry, or fish. Eggs. Nelson. Tofu (firm) and tempeh. Limited amounts of nuts and seeds, such as almonds, walnuts, brazil nuts, pecans, peanuts, nut butters, pumpkin seeds, benny seeds, and sunflower seeds.  Dairy  Lactose-free milk, yogurt, and kefir. Lactose-free cottage cheese and ice cream. Non-dairy milks, such as almond, coconut, hemp, and rice milk. Non-dairy yogurt. Limited amounts of goat cheese, brie, mozzarella, parmesan, swiss, and other hard cheeses.  Fats and oils  Butter-free spreads. Vegetable oils, such as olive, canola, and sunflower oil.  Seasoning and other foods  Artificial sweeteners with names that do not end in \"ol,\" such as aspartame, saccharine, and stevia. Maple syrup, white table sugar, raw sugar, brown sugar, and molasses. Mayonnaise, soy sauce, and tamari. Fresh basil, coriander, parsley, rosemary, and thyme.  Beverages  Water and mineral water. Sugar-sweetened soft drinks. Small amounts of orange juice or cranberry juice. Black and green tea. " Most dry karime. Coffee.  The items listed above may not be a complete list of foods and beverages you can eat. Contact a dietitian for more information.  Foods to avoid  Fruits  Fresh, dried, and juiced forms of apple, pear, watermelon, peach, plum, cherries, apricots, blackberries, boysenberries, figs, nectarines, and vivienne. Avocado.  Vegetables  Chicory root, artichoke, asparagus, cabbage, snow peas, Weiner sprouts, broccoli, sugar snap peas, mushrooms, celery, and cauliflower. Onions, garlic, leeks, and the white part of scallions.  Grains  Wheat, including kamut, durum, and semolina. Barley and bulgur. Couscous. Wheat-based cereals. Wheat noodles, bread, crackers, and pastries.  Meats and other proteins  Fried or fatty meat. Sausage. Cashews and pistachios. Soybeans, baked beans, black beans, chickpeas, kidney beans, charmaine beans, navy beans, lentils, black-eyed peas, and split peas.  Dairy  Milk, yogurt, ice cream, and soft cheese. Cream and sour cream. Milk-based sauces. Custard. Buttermilk. Soy milk.  Seasoning and other foods  Any sugar-free gum or candy. Foods that contain artificial sweeteners such as sorbitol, mannitol, isomalt, or xylitol. Foods that contain honey, high-fructose corn syrup, or agave. Bouillon, vegetable stock, beef stock, and chicken stock. Garlic and onion powder. Condiments made with onion, such as hummus, chutney, pickles, relish, salad dressing, and salsa. Tomato paste.  Beverages  Chicory-based drinks. Coffee substitutes. Chamomile tea. Fennel tea. Sweet or fortified karime such as port or leeann. Diet soft drinks made with isomalt, mannitol, maltitol, sorbitol, or xylitol. Apple, pear, and vivienne juice. Juices with high-fructose corn syrup.  The items listed above may not be a complete list of foods and beverages you should avoid. Contact a dietitian for more information.  Summary  · FODMAP stands for fermentable oligosaccharides, disaccharides, monosaccharides, and polyols. These  are sugars that are hard for some people to digest.  · A low-FODMAP eating plan is a short-term diet that helps to ease symptoms of certain bowel diseases.  · The eating plan usually lasts up to 6 weeks. After that, high-FODMAP foods are reintroduced gradually and one at a time. This can help you find out which foods may be causing symptoms.  · A low-FODMAP eating plan can be complicated. It is best to work with a dietitian who has experience with this type of plan.  This information is not intended to replace advice given to you by your health care provider. Make sure you discuss any questions you have with your health care provider.  Document Revised: 05/06/2021 Document Reviewed: 05/06/2021  Elsevier Patient Education © 2021 [employer]

## 2021-11-15 NOTE — PROGRESS NOTES
"     New Patient Consult      Date: 11/15/2021   Patient Name: Jeni Newby  MRN: [id number]  : 1965     Referring Physician: Philipp Boogie MD      Chief Complaint   Patient presents with   • Colon Cancer Screening       History of Present Illness: Jeni Newby is a 56 y.o. female who is here today to establish care with Gastroenterology for to schedule her surveillance colonoscopy.  She has a chronic diarrhea abdominal bloating and fecal urgency.  She is much better after she changed her diet and followed the FODMAP diet and started on probiotics.    2018  The patient came back for follow visit today. Currently the patient has been having irregular bowel movements. She is been having alternating constipation and diarrhea. The patient has a history of constipation off and on for the last 3 months . Constipation is episodic. It may last for 4-5 days.Severity is mild. This is described as firm stools perhaps one bowel movement every other day. The patient is not taking laxatives. There is no associated rectal pain.episodes of constipation are followed by episodes of diarrhea. During the diarrheal episodes the patient may have 5-6 watery bowel movements a day. The diarrhea may last for a couple of days. The patient has been complaining of gas and bloated feeling as well. There is no history of overt GI bleed (Hematemesis, melena or hematochezia). She denies nausea or vomiting. There is no fever or chills. She denies recent weight loss. Her abdominal pain has significantly           Subjective      Past Medical History:   Past Medical History:   Diagnosis Date   • Allergic    • Asthma     ALLERGY INDUCED    • Body piercing     EARS   • Bronchitis    • Cancer (HCC) 2017    basal cell carcinoma, NOSE    • Colonic polyp     REPORTS HAD A \"CARPET GROWTH THAT REQUIRED A PORTION OF HER COLON BE REMOVED\"   • History of bronchitis    • History of migraine    • Seasonal allergies    • Wears eyeglasses "    • Wears glasses        Past Surgical History:   Past Surgical History:   Procedure Laterality Date   • COLON RESECTION N/A 9/7/2016    Procedure: LOW ANTERIOR COLON RESECTION LAPAROSCOPIC  WITH DAVINCI SI ROBOT VS EXTENDED LEFT COLECTOMY WITH TAP BLOCK;  Surgeon: Isra Melendez MD;  Location:  [facility];  Service:    • COLONOSCOPY      2016   • COLONOSCOPY N/A 12/22/2017    Procedure: COLONOSCOPY WITH BIOPSIES;  Surgeon: Tino Stapleton MD;  Location:  [facility];  Service:    • PROCTOSCOPY N/A 9/7/2016    Procedure: PROCTOSCOPY RIGID;  Surgeon: Isra Melendez MD;  Location:  [facility];  Service:    • SKIN CANCER EXCISION     • WISDOM TOOTH EXTRACTION         Family History:   Family History   Problem Relation Age of Onset   • Ovarian cancer Maternal Grandmother 64   • Breast cancer Maternal Aunt 86   • Heart disease Mother        Social History:   Social History     Socioeconomic History   • Marital status: Single   Tobacco Use   • Smoking status: Never Smoker   • Smokeless tobacco: Never Used   Vaping Use   • Vaping Use: Never used   Substance and Sexual Activity   • Alcohol use: No   • Drug use: No   • Sexual activity: Yes     Partners: Male         Current Outpatient Medications:   •  budesonide-formoterol (SYMBICORT) 80-4.5 MCG/ACT inhaler, Inhale 2 puffs 2 (Two) Times a Day. Symbicort, Disp: , Rfl:   •  Calcium Carb-Cholecalciferol (CALCIUM 1000 + D PO), Take  by mouth., Disp: , Rfl:   •  Glucosamine-Chondroit-Vit C-Mn (GLUCOSAMINE 1500 COMPLEX PO), Take 1 tablet by mouth Daily., Disp: , Rfl:   •  Multiple Vitamins-Minerals (MULTIVITAMIN ADULT PO), Take 1 tablet by mouth daily., Disp: , Rfl:   •  PROAIR  (90 BASE) MCG/ACT inhaler, Inhale 1 puff Every 6 (Six) Hours As Needed for Wheezing., Disp: , Rfl:   •  Probiotic Product (PROBIOTIC-10 PO), , Disp: , Rfl:   •  Sodium Sulfate-Mag Sulfate-KCl 1645-569-144 MG tablet, Take 12 tablet/day by mouth 2 (Two) Times a Day., Disp: 24 tablet, Rfl:  "0    Allergies   Allergen Reactions   • Corn-Containing Products      Cramps and diarrhea   • Mushroom Extract Complex Swelling     Throat swelling       Review of Systems:   Review of Systems   Constitutional: Negative for appetite change, fatigue, fever and unexpected weight loss.   HENT: Negative for trouble swallowing.    Gastrointestinal: Positive for diarrhea. Negative for abdominal distention, abdominal pain, anal bleeding, blood in stool, constipation, nausea, rectal pain, vomiting, GERD and indigestion.       The following portions of the patient's history were reviewed and updated as appropriate: allergies, current medications, past family history, past medical history, past social history, past surgical history and problem list.    Objective     Physical Exam:  Vital Signs:   Vitals:    11/15/21 1428   BP: 100/70   Temp: 98 °F (36.7 °C)   TempSrc: Infrared   Weight: 61.2 kg (135 lb)   Height: 167.6 cm (66\")       Physical Exam  Constitutional:       Appearance: Normal appearance.   HENT:      Head: Normocephalic and atraumatic.   Eyes:      Conjunctiva/sclera: Conjunctivae normal.   Abdominal:      General: Abdomen is flat. There is no distension.      Palpations: There is no mass.      Tenderness: There is no abdominal tenderness. There is no guarding or rebound.      Hernia: No hernia is present.   Musculoskeletal:      Cervical back: Normal range of motion and neck supple.   Neurological:      Mental Status: She is alert.           Results Review:   I have reviewed the patient's new clinical and imaging results and agree with the interpretation.     Office Visit on 09/09/2021   Component Date Value Ref Range Status   • Glucose 09/09/2021 81  65 - 99 mg/dL Final   • BUN 09/09/2021 14  6 - 20 mg/dL Final   • Creatinine 09/09/2021 0.71  0.57 - 1.00 mg/dL Final   • eGFR Non  Am 09/09/2021 85  >60 mL/min/1.73 Final    Comment: GFR Normal >60  Chronic Kidney Disease <60  Kidney Failure <15     • eGFR "  Am 09/09/2021 103  >60 mL/min/1.73 Final   • BUN/Creatinine Ratio 09/09/2021 19.7  7.0 - 25.0 Final   • Sodium 09/09/2021 143  136 - 145 mmol/L Final   • Potassium 09/09/2021 4.5  3.5 - 5.2 mmol/L Final   • Chloride 09/09/2021 105  98 - 107 mmol/L Final   • Total CO2 09/09/2021 26.4  22.0 - 29.0 mmol/L Final   • Calcium 09/09/2021 9.6  8.6 - 10.5 mg/dL Final   • Total Protein 09/09/2021 7.0  6.0 - 8.5 g/dL Final   • Albumin 09/09/2021 4.60  3.50 - 5.20 g/dL Final   • Globulin 09/09/2021 2.4  gm/dL Final   • A/G Ratio 09/09/2021 1.9  g/dL Final   • Total Bilirubin 09/09/2021 0.4  0.0 - 1.2 mg/dL Final   • Alkaline Phosphatase 09/09/2021 69  39 - 117 U/L Final   • AST (SGOT) 09/09/2021 19  1 - 32 U/L Final   • ALT (SGPT) 09/09/2021 12  1 - 33 U/L Final   • WBC 09/09/2021 4.33  3.40 - 10.80 10*3/mm3 Final   • RBC 09/09/2021 5.01  3.77 - 5.28 10*6/mm3 Final   • Hemoglobin 09/09/2021 14.8  12.0 - 15.9 g/dL Final   • Hematocrit 09/09/2021 45.0  34.0 - 46.6 % Final   • MCV 09/09/2021 89.8  79.0 - 97.0 fL Final   • MCH 09/09/2021 29.5  26.6 - 33.0 pg Final   • MCHC 09/09/2021 32.9  31.5 - 35.7 g/dL Final   • RDW 09/09/2021 12.5  12.3 - 15.4 % Final   • Platelets 09/09/2021 125* 140 - 450 10*3/mm3 Final   • Neutrophil Rel % 09/09/2021 71.4  42.7 - 76.0 % Final   • Lymphocyte Rel % 09/09/2021 21.9  19.6 - 45.3 % Final   • Monocyte Rel % 09/09/2021 5.3  5.0 - 12.0 % Final   • Eosinophil Rel % 09/09/2021 0.2* 0.3 - 6.2 % Final   • Basophil Rel % 09/09/2021 0.5  0.0 - 1.5 % Final   • Neutrophils Absolute 09/09/2021 3.09  1.70 - 7.00 10*3/mm3 Final   • Lymphocytes Absolute 09/09/2021 0.95  0.70 - 3.10 10*3/mm3 Final   • Monocytes Absolute 09/09/2021 0.23  0.10 - 0.90 10*3/mm3 Final   • Eosinophils Absolute 09/09/2021 0.01  0.00 - 0.40 10*3/mm3 Final   • Basophils Absolute 09/09/2021 0.02  0.00 - 0.20 10*3/mm3 Final   • Immature Granulocyte Rel % 09/09/2021 0.7* 0.0 - 0.5 % Final   • Immature Grans Absolute 09/09/2021 0.03   0.00 - 0.05 10*3/mm3 Final   • Vitamin B-12 09/09/2021 1,309* 211 - 946 pg/mL Final    Results may be falsely increased if patient taking Biotin.   • LDL Cholesterol  09/09/2021 117* 0 - 99 mg/dL Final      XR Spine Thoracic 3 View    Result Date: 9/22/2021  No acute process.  Questionable dextrocardia. Authenticated by Thomas Reynoso MD on 09/22/2021 04:25:04 PM    XR Spine Lumbar 2 or 3 View    Result Date: 9/22/2021  Mild degenerative change in the lower lumbar spine. Authenticated by Thomas Reynoso MD on 09/22/2021 04:25:12 PM    XR Spine Cervical 3 View    Result Date: 9/22/2021  No acute process. Authenticated by Thomas Reynoso MD on 09/22/2021 04:25:10 PM    Dated August 15, 2016 the patient underwent a colonoscopy by Dr. Melendez which revealed: Large polyp at 30 cm.  This has a globular component with central dimpling.  It also seems to be growing like a  carpet out over the mucosa as well.  The bulk of this was removed with hot snare polypectomy.  Dina ink was used to marianne the area.  Otherwise, the patient had a normal colon to the cecum with the exception of a long and redundant colon.  Internal hemorrhoids, grade 1.  Sphincter tone, normal.     Dated December 22, 2017 the patient underwent a colonoscopy to the terminal ileum which revealed: Left colonic anastomosis.  Scant erosion at the anastomotic site.  Biopsies of the anastomotic site.  Internal hemorrhoids.  No endoscopic evidence of colitis was seen.  Random biopsies were obtained from the colon upon withdrawal of the scope.  Scant vascular ectasia.  The colon was noted to be dilated and redundant.  Random colon biopsies revealed compatible with sessile serrated adenoma.  Benign lymphoid aggregates.  Colon, anastomotic site, biopsies revealed benign colonic mucosa with no diagnostic abdomen abnormalities.       Assessment / Plan      Assessment & Plan:  1. Personal history of colonic polyps  This patient had a advanced polyp partially removed  endoscopically in 2016 followed by surgical resection.  Her last colonoscopy was in 2017 and had a 1 sessile serrated adenoma excised.  She is due for her surveillance colonoscopy next year in 2022.  We will schedule the colonoscopy for early next year    The indications, technique, alternatives and potential risk and complications were discussed with the patient including but not limited to bleeding, bowel perforations, missing lesions and anesthetic complications. The patient understands and wishes to proceed with the procedure and has given their verbal consent. Written patient education information was given to the patient.   The patient will call if they have further questions before procedure.     2. Diarrhea, unspecified type  3. Irritable bowel syndrome with diarrhea predominance   Patient's history is very suggestive of IBS mixed type with diarrhea predominance.  Her symptoms have significantly improved after she followed the FODMAP diet and started on a probiotic.  Her celiac serology was negative in 2018.  I have advised Imodium half tablets 1 tablet as needed for bowel movement anywhere more than 2 to 3/day  Advised to avoid cheese and lactose which patient appears to have some intolerance  Advised to take MiraLAX 17 g p.o. as needed for constipation    We will get a stool calprotectin and the pancreatic elastase    - Calprotectin, Fecal - Stool, Per Rectum; Future  - Pancreatic Elastase, Fecal - Stool, Per Rectum; Future        Follow Up:   Return for Follow Up after procedure.    Lidia Barker MD  [facility]  11/15/2021  19:00 EST    Please note that portions of this note may have been completed with a voice recognition program.

## 2021-11-16 PROBLEM — Z86.010 PERSONAL HISTORY OF COLONIC POLYPS: Status: ACTIVE | Noted: 2021-11-16

## 2021-11-16 PROBLEM — Z86.0100 PERSONAL HISTORY OF COLONIC POLYPS: Status: ACTIVE | Noted: 2021-11-16

## 2021-11-19 ENCOUNTER — PATIENT ROUNDING (BHMG ONLY) (OUTPATIENT)
Dept: GASTROENTEROLOGY | Facility: CLINIC | Age: 56
End: 2021-11-19

## 2021-11-22 ENCOUNTER — OFFICE VISIT (OUTPATIENT)
Dept: INTERNAL MEDICINE | Facility: CLINIC | Age: 56
End: 2021-11-22

## 2021-11-22 VITALS
OXYGEN SATURATION: 97 % | BODY MASS INDEX: 21.66 KG/M2 | TEMPERATURE: 97.5 F | SYSTOLIC BLOOD PRESSURE: 98 MMHG | WEIGHT: 134.8 LBS | HEART RATE: 95 BPM | HEIGHT: 66 IN | DIASTOLIC BLOOD PRESSURE: 64 MMHG

## 2021-11-22 DIAGNOSIS — J00 ACUTE RHINITIS: Primary | ICD-10-CM

## 2021-11-22 PROCEDURE — 99213 OFFICE O/P EST LOW 20 MIN: CPT | Performed by: INTERNAL MEDICINE

## 2021-11-22 RX ORDER — PROMETHAZINE HYDROCHLORIDE AND CODEINE PHOSPHATE 6.25; 1 MG/5ML; MG/5ML
5 SYRUP ORAL EVERY 6 HOURS PRN
Qty: 118 ML | Refills: 0 | Status: SHIPPED | OUTPATIENT
Start: 2021-11-22 | End: 2021-12-21

## 2021-11-22 NOTE — PROGRESS NOTES
"Subjective  Jeni Newby is a 56 y.o. female    HPI coming in with complains of itchy runny nose watery eyes dry cough no fever or chills non-smoker has been immunized against Covid    The following portions of the patient's history were reviewed and updated as appropriate: allergies, current medications, past family history, past medical history, past social history, past surgical history, and problem list.     Review of Systems   Constitutional: Negative.  Negative for activity change, appetite change, fatigue and fever.   HENT: Positive for congestion. Negative for ear discharge, ear pain and trouble swallowing.    Eyes: Negative for photophobia and visual disturbance.   Respiratory: Negative for cough and shortness of breath.    Cardiovascular: Negative for chest pain and palpitations.   Gastrointestinal: Negative for abdominal distention, abdominal pain, constipation, diarrhea, nausea and vomiting.   Endocrine: Negative.    Genitourinary: Negative for dysuria, hematuria and urgency.   Musculoskeletal: Negative for arthralgias, back pain, joint swelling and myalgias.   Skin: Negative for color change and rash.   Allergic/Immunologic: Negative.    Neurological: Negative for dizziness, weakness, light-headedness and headaches.   Hematological: Negative for adenopathy. Does not bruise/bleed easily.   Psychiatric/Behavioral: Negative for agitation, confusion and dysphoric mood. The patient is not nervous/anxious.        Visit Vitals  BP 98/64   Pulse 95   Temp 97.5 °F (36.4 °C)   Ht 167.6 cm (65.98\")   Wt 61.1 kg (134 lb 12.8 oz)   LMP  (LMP Unknown) Comment: PATIENT REPORTS LMP 2-3 YEARS AGO. DENIES ANY HX OF ABLATION.   SpO2 97%   BMI 21.77 kg/m²       Objective  Physical Exam  Constitutional:       General: She is not in acute distress.     Appearance: She is well-developed.   HENT:      Nose: Nose normal.   Eyes:      General: No scleral icterus.     Conjunctiva/sclera: Conjunctivae normal.   Neck:      " Thyroid: No thyromegaly.      Trachea: No tracheal deviation.   Cardiovascular:      Rate and Rhythm: Normal rate and regular rhythm.      Heart sounds: No murmur heard.  No friction rub.   Pulmonary:      Effort: No respiratory distress.      Breath sounds: No wheezing or rales.   Abdominal:      General: There is no distension.      Palpations: Abdomen is soft. There is no mass.      Tenderness: There is no abdominal tenderness. There is no guarding.   Musculoskeletal:         General: No deformity. Normal range of motion.   Lymphadenopathy:      Cervical: No cervical adenopathy.   Skin:     General: Skin is warm and dry.      Findings: No erythema or rash.   Neurological:      Mental Status: She is alert and oriented to person, place, and time.      Cranial Nerves: No cranial nerve deficit.      Coordination: Coordination normal.      Deep Tendon Reflexes: Reflexes are normal and symmetric.   Psychiatric:         Behavior: Behavior normal.         Thought Content: Thought content normal.         Judgment: Judgment normal.         Diagnoses and all orders for this visit:    Acute rhinitis discussed steroid no spray over-the-counter cough syrup with codeine.  She started taking Claritin with decongestant today.  Advised to continue that for now

## 2021-12-21 ENCOUNTER — OFFICE VISIT (OUTPATIENT)
Dept: OBSTETRICS AND GYNECOLOGY | Facility: CLINIC | Age: 56
End: 2021-12-21

## 2021-12-21 ENCOUNTER — HOSPITAL ENCOUNTER (OUTPATIENT)
Dept: MAMMOGRAPHY | Facility: HOSPITAL | Age: 56
Discharge: HOME | End: 2021-12-21
Admitting: OBSTETRICS & GYNECOLOGY

## 2021-12-21 VITALS
BODY MASS INDEX: 22.02 KG/M2 | WEIGHT: 137 LBS | SYSTOLIC BLOOD PRESSURE: 100 MMHG | HEIGHT: 66 IN | DIASTOLIC BLOOD PRESSURE: 62 MMHG

## 2021-12-21 DIAGNOSIS — Z01.419 WOMEN'S ANNUAL ROUTINE GYNECOLOGICAL EXAMINATION: Primary | ICD-10-CM

## 2021-12-21 DIAGNOSIS — Z12.31 VISIT FOR SCREENING MAMMOGRAM: ICD-10-CM

## 2021-12-21 PROCEDURE — 77067 SCR MAMMO BI INCL CAD: CPT | Performed by: RADIOLOGY

## 2021-12-21 PROCEDURE — 77063 BREAST TOMOSYNTHESIS BI: CPT | Performed by: RADIOLOGY

## 2021-12-21 PROCEDURE — 77063 BREAST TOMOSYNTHESIS BI: CPT

## 2021-12-21 PROCEDURE — 77067 SCR MAMMO BI INCL CAD: CPT

## 2021-12-21 PROCEDURE — 99396 PREV VISIT EST AGE 40-64: CPT | Performed by: OBSTETRICS & GYNECOLOGY

## 2021-12-21 NOTE — PROGRESS NOTES
GYN Annual Exam     CC - Here for annual exam.        HPI  Jeni Newby is a 56 y.o. female, , who presents for annual well woman exam.  She is postmenopausal.  Patient denies vaginal bleeding. . Patient reports problems with: vaginal dryness. There were no changes to her medical or surgical history since her last visit.. Partner Status: Marital Status: single.  New Partners since last visit: no.      Additional OB/GYN History   Current contraception: contraceptive methods: Post menopausal status  On HRT? No  Last Pap :   Last Completed Pap Smear          Ordered - PAP SMEAR (Yearly) Ordered on 2021  Pap IG, Rfx HPV ASCU    2018  SCANNED - PAP SMEAR    2018  Done              History of abnormal Pap smear: no  Family history of uterine, colon, breast, or ovarian cancer: yes - MGM had ovarian cancer and maternal aunt with breast cancer. Patient. had 18 in of colon removed for precancerous cells.  Performs monthly Self-Breast Exam: yes  Last mammogram: today. Done at .    Last Completed Mammogram          Scheduled - MAMMOGRAM (Every 2 Years) Scheduled for 2021  Mammo Screening Digital Tomosynthesis Bilateral With CAD    2020  Done    2019  Mammo Screening Digital Tomosynthesis Bilateral With CAD    2018  Mammo Screening Digital Tomosynthesis Bilateral With CAD    2017  Mammo Screening Digital Tomosynthesis Bilateral With CAD    Only the first 5 history entries have been loaded, but more history exists.              Last colonoscopy:   Last Completed Colonoscopy          COLORECTAL CANCER SCREENING (COLONOSCOPY - Every 10 Years) Next due on 2027  SCANNED - COLONOSCOPY    2017  Surgical Procedure: COLONOSCOPY    08/15/2016  SCANNED - COLONOSCOPY              Last DEXA: On 2018 and results were Normal  Exercises Regularly: no  Feelings of Anxiety or Depression: no      Tobacco Usage?: No   OB History   "      0    Para   0    Term   0       0    AB   0    Living   0       SAB   0    IAB   0    Ectopic   0    Molar   0    Multiple   0    Live Births   0                Health Maintenance   Topic Date Due   • Annual Gynecologic Pelvic and Breast Exam  2021   • PAP SMEAR  2021   • LIPID PANEL  2022   • ANNUAL PHYSICAL  09/10/2022   • MAMMOGRAM  2022   • COLORECTAL CANCER SCREENING  2027   • TDAP/TD VACCINES (2 - Td or Tdap) 2029   • HEPATITIS C SCREENING  Completed   • COVID-19 Vaccine  Completed   • INFLUENZA VACCINE  Completed   • ZOSTER VACCINE  Completed   • Pneumococcal Vaccine 0-64  Aged Out       The additional following portions of the patient's history were reviewed and updated as appropriate: allergies, current medications, past family history, past medical history, past social history, past surgical history and problem list.    Review of Systems   Constitutional: Negative.    HENT: Negative.    Eyes: Negative.    Respiratory: Negative.    Cardiovascular: Negative.    Gastrointestinal: Negative.    Endocrine: Negative.    Genitourinary: Negative.    Musculoskeletal: Negative.    Skin: Negative.    Allergic/Immunologic: Negative.    Neurological: Negative.    Hematological: Negative.    Psychiatric/Behavioral: Negative.        I have reviewed and agree with the HPI, ROS, and historical information as entered above. Stan Sal MD    Objective   /62   Ht 167.6 cm (66\")   Wt 62.1 kg (137 lb)   LMP  (LMP Unknown) Comment: PATIENT REPORTS LMP 2-3 YEARS AGO. DENIES ANY HX OF ABLATION.  BMI 22.11 kg/m²     Physical Exam  Vitals and nursing note reviewed. Exam conducted with a chaperone present.   Constitutional:       Appearance: She is well-developed.   HENT:      Head: Normocephalic and atraumatic.   Neck:      Thyroid: No thyroid mass or thyromegaly.   Cardiovascular:      Rate and Rhythm: Normal rate and regular rhythm.      Heart sounds: No murmur " heard.      Pulmonary:      Effort: Pulmonary effort is normal. No retractions.      Breath sounds: Normal breath sounds. No wheezing, rhonchi or rales.   Chest:      Chest wall: No mass or tenderness.   Breasts:      Right: Normal. No mass, nipple discharge, skin change or tenderness.      Left: Normal. No mass, nipple discharge, skin change or tenderness.       Abdominal:      General: Bowel sounds are normal.      Palpations: Abdomen is soft. Abdomen is not rigid. There is no mass.      Tenderness: There is no abdominal tenderness. There is no guarding.      Hernia: No hernia is present. There is no hernia in the left inguinal area.   Genitourinary:     Labia:         Right: No rash, tenderness or lesion.         Left: No rash, tenderness or lesion.       Vagina: Normal. No vaginal discharge or lesions.      Cervix: No cervical motion tenderness, discharge, lesion or cervical bleeding.      Uterus: Normal. Not enlarged, not fixed and not tender.       Adnexa:         Right: No mass or tenderness.          Left: No mass or tenderness.        Rectum: No external hemorrhoid.   Musculoskeletal:      Cervical back: Normal range of motion. No muscular tenderness.   Neurological:      Mental Status: She is alert and oriented to person, place, and time.   Psychiatric:         Behavior: Behavior normal.            Assessment and Plan    Problem List Items Addressed This Visit     None      Visit Diagnoses     Women's annual routine gynecological examination    -  Primary    Relevant Orders    Pap IG, Rfx HPV ASCU          1. GYN annual well woman exam.   2. Reviewed monthly self breast exams.  Instructed to call with lumps, pain, or breast discharge.  Yearly mammograms ordered.  3. Recommended use of Vitamin D and getting adequate calcium in her diet. (1500mg)  4. Reviewed exercise as a preventative health measures.   5. Instructed on [place] exercises and gave patient educational information.  6. RTC in 1 year or PRN with  problems.  7. No follow-ups on file.     Stan Sal MD  12/21/2021

## 2022-01-04 DIAGNOSIS — Z01.419 WOMEN'S ANNUAL ROUTINE GYNECOLOGICAL EXAMINATION: ICD-10-CM

## 2022-01-05 NOTE — PRE-PROCEDURE INSTRUCTIONS
PAT phone history completed with pt for upcoming procedure on 1/6/22 with Dr. Barker.     PAT PASS GIVEN/REVIEWED WITH PT.  VERBALIZED UNDERSTANDING OF THE FOLLOWING:  DO NOT EAT, DRINK, SMOKE, USE SMOKELESS TOBACCO OR CHEW GUM AFTER MIDNIGHT THE NIGHT BEFORE SURGERY.  THIS ALSO INCLUDES HARD CANDIES AND MINTS.    DO NOT SHAVE THE AREA TO BE OPERATED ON AT LEAST 48 HOURS PRIOR TO THE PROCEDURE.  DO NOT WEAR MAKE UP OR NAIL POLISH.  DO NOT LEAVE IN ANY PIERCING OR WEAR JEWELRY THE DAY OF SURGERY.      DO NOT USE ADHESIVES IF YOU WEAR DENTURES.    DO NOT WEAR EYE CONTACTS; BRING IN YOUR GLASSES.    ONLY TAKE MEDICATION THE MORNING OF YOUR PROCEDURE IF INSTRUCTED BY YOUR SURGEON WITH ENOUGH WATER TO SWALLOW THE MEDICATION.  IF YOUR SURGEON DID NOT SPECIFY WHICH MEDICATIONS TO TAKE, YOU WILL NEED TO CALL THEIR OFFICE FOR FURTHER INSTRUCTIONS AND DO AS THEY INSTRUCT.    LEAVE ANYTHING YOU CONSIDER VALUABLE AT HOME.    YOU WILL NEED TO ARRANGE FOR SOMEONE TO DRIVE YOU HOME AFTER SURGERY.  IT IS RECOMMENDED THAT YOU DO NOT DRIVE, WORK, DRINK ALCOHOL OR MAKE MAJOR DECISIONS FOR AT LEAST 24 HOURS AFTER YOUR PROCEDURE IS COMPLETE.      THE DAY OF YOUR PROCEDURE, BRING IN THE FOLLOWING IF APPLICABLE:   PICTURE ID AND INSURANCE/MEDICARE OR MEDICAID CARDS/ANY CO-PAY THAT MAY BE DUE   COPY OF ADVANCED DIRECTIVE/LIVING WILL/POWER OR    CPAP/BIPAP/INHALERS   SKIN PREP SHEET   YOUR PREADMISSION TESTING PASS (IF NOT A PHONE HISTORY)

## 2022-01-06 ENCOUNTER — ANESTHESIA (OUTPATIENT)
Dept: GASTROENTEROLOGY | Facility: HOSPITAL | Age: 57
End: 2022-01-06

## 2022-01-06 ENCOUNTER — ANESTHESIA EVENT (OUTPATIENT)
Dept: GASTROENTEROLOGY | Facility: HOSPITAL | Age: 57
End: 2022-01-06

## 2022-01-06 ENCOUNTER — HOSPITAL ENCOUNTER (OUTPATIENT)
Facility: HOSPITAL | Age: 57
Setting detail: HOSPITAL OUTPATIENT SURGERY
Discharge: HOME | End: 2022-01-06
Attending: INTERNAL MEDICINE | Admitting: INTERNAL MEDICINE

## 2022-01-06 VITALS
TEMPERATURE: 97.2 F | OXYGEN SATURATION: 95 % | RESPIRATION RATE: 20 BRPM | BODY MASS INDEX: 21.69 KG/M2 | HEART RATE: 70 BPM | DIASTOLIC BLOOD PRESSURE: 76 MMHG | SYSTOLIC BLOOD PRESSURE: 109 MMHG | WEIGHT: 135 LBS | HEIGHT: 66 IN

## 2022-01-06 DIAGNOSIS — Z86.010 PERSONAL HISTORY OF COLONIC POLYPS: ICD-10-CM

## 2022-01-06 PROCEDURE — 45385 COLONOSCOPY W/LESION REMOVAL: CPT | Performed by: INTERNAL MEDICINE

## 2022-01-06 PROCEDURE — 25010000002 PROPOFOL 1000 MG/100ML EMULSION: Performed by: NURSE ANESTHETIST, CERTIFIED REGISTERED

## 2022-01-06 PROCEDURE — 45380 COLONOSCOPY AND BIOPSY: CPT | Performed by: INTERNAL MEDICINE

## 2022-01-06 RX ORDER — PROPOFOL 10 MG/ML
INJECTION, EMULSION INTRAVENOUS AS NEEDED
Status: DISCONTINUED | OUTPATIENT
Start: 2022-01-06 | End: 2022-01-06 | Stop reason: SURG

## 2022-01-06 RX ORDER — SODIUM CHLORIDE 9 MG/ML
70 INJECTION, SOLUTION INTRAVENOUS CONTINUOUS PRN
Status: DISCONTINUED | OUTPATIENT
Start: 2022-01-06 | End: 2022-01-06 | Stop reason: HOSPADM

## 2022-01-06 RX ORDER — KETAMINE HYDROCHLORIDE 50 MG/ML
INJECTION, SOLUTION, CONCENTRATE INTRAMUSCULAR; INTRAVENOUS AS NEEDED
Status: DISCONTINUED | OUTPATIENT
Start: 2022-01-06 | End: 2022-01-06 | Stop reason: SURG

## 2022-01-06 RX ORDER — LIDOCAINE HYDROCHLORIDE 20 MG/ML
INJECTION, SOLUTION INTRAVENOUS AS NEEDED
Status: DISCONTINUED | OUTPATIENT
Start: 2022-01-06 | End: 2022-01-06 | Stop reason: SURG

## 2022-01-06 RX ADMIN — LIDOCAINE HYDROCHLORIDE 50 MG: 20 INJECTION, SOLUTION INTRAVENOUS at 09:25

## 2022-01-06 RX ADMIN — PROPOFOL 100 MG: 10 INJECTION, EMULSION INTRAVENOUS at 09:30

## 2022-01-06 RX ADMIN — PROPOFOL 100 MG: 10 INJECTION, EMULSION INTRAVENOUS at 09:25

## 2022-01-06 RX ADMIN — SODIUM CHLORIDE 70 ML/HR: 9 INJECTION, SOLUTION INTRAVENOUS at 08:39

## 2022-01-06 RX ADMIN — KETAMINE HYDROCHLORIDE 25 MG: 50 INJECTION, SOLUTION INTRAMUSCULAR; INTRAVENOUS at 09:25

## 2022-01-06 NOTE — ANESTHESIA PREPROCEDURE EVALUATION
Anesthesia Evaluation     Patient summary reviewed and Nursing notes reviewed   NPO Solid Status: > 8 hours  NPO Liquid Status: > 8 hours           Airway   Mallampati: I  TM distance: >3 FB  Neck ROM: full  Possible difficult intubation  Dental      Pulmonary - normal exam    breath sounds clear to auscultation  (+) pneumonia resolved , asthma,recent URI (rib pain left sternal border secondary to coughing/ bronchitis) resolved,   Cardiovascular - negative cardio ROS and normal exam    Rhythm: regular  Rate: normal        Neuro/Psych- negative ROS  GI/Hepatic/Renal/Endo - negative ROS     Musculoskeletal (-) negative ROS    Abdominal    Substance History - negative use     OB/GYN negative ob/gyn ROS         Other      history of cancer                      Anesthesia Plan    ASA 2     MAC     intravenous induction     Anesthetic plan, all risks, benefits, and alternatives have been provided, discussed and informed consent has been obtained with: patient.

## 2022-01-06 NOTE — ANESTHESIA POSTPROCEDURE EVALUATION
Patient: Jeni Newby    Procedure Summary       Date: 01/06/22 Room / Location: [facility] ENDOSCOPY 2 / [facility] ENDOSCOPY    Anesthesia Start: 0925 Anesthesia Stop: 1002    Procedure: COLONOSCOPY with biopsies and polypectomy x1  (N/A Anus) Diagnosis:       Personal history of colonic polyps      (Personal history of colonic polyps [Z86.010])    Surgeons: Lidia Barker MD Provider: Prabhjot Gayle CRNA    Anesthesia Type: MAC ASA Status: 2            Anesthesia Type: MAC    Vitals  Vitals Value Taken Time   /76 01/06/22 1032   Temp 97.2 °F (36.2 °C) 01/06/22 1000   Pulse 70 01/06/22 1032   Resp 20 01/06/22 1032   SpO2 95 % 01/06/22 1032           Post Anesthesia Care and Evaluation    Patient location during evaluation: bedside  Patient participation: complete - patient participated  Level of consciousness: awake  Pain score: 0  Pain management: adequate  Airway patency: patent  Anesthetic complications: No anesthetic complications  PONV Status: controlled  Cardiovascular status: acceptable and stable  Respiratory status: acceptable and room air  Hydration status: acceptable

## 2022-01-06 NOTE — DISCHARGE INSTRUCTIONS
Rest today  No pushing,pulling,tugging,heavy lifting, or strenuous activity   No major decision making,driving,or drinking alcoholic beverages for 24 hours due to the sedation you received  Always use good hand hygiene/washing technique  No driving on pain medication.    To assist you in voiding:  Drink plenty of fluids  Listen to running water while attempting to void.    If you are unable to urinate and you have an uncomfortable urge to void or it has been   6 hours since you were discharged, return to the Emergency Room.    - Discharge patient to home (ambulatory).   - Resume previous diet.   - Continue present medications.   - Avoid ASA/NSAIDS for 2 days   - Await pathology results.   - Repeat colonoscopy in 5 years for surveillance due to advanced polyp removed surgically in the past.   - Return to GI office in 8 weeks.
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
## 2022-01-10 ENCOUNTER — TELEPHONE (OUTPATIENT)
Dept: GASTROENTEROLOGY | Facility: CLINIC | Age: 57
End: 2022-01-10

## 2022-01-10 NOTE — TELEPHONE ENCOUNTER
I called the patient regarding postprocedure throat discomfort.   Patient likely had throat suction during the procedure.  Unclear whether she had some trauma.  I would not expect anything more than that.   Advised to take Tylenol for couple of days and salt gargle twice daily for 2 to 3 days

## 2022-01-14 LAB
LAB AP CASE REPORT: NORMAL
PATH REPORT.FINAL DX SPEC: NORMAL

## 2022-02-17 ENCOUNTER — PATIENT MESSAGE (OUTPATIENT)
Dept: INTERNAL MEDICINE | Facility: CLINIC | Age: 57
End: 2022-02-17

## 2022-02-25 RX ORDER — AMOXICILLIN AND CLAVULANATE POTASSIUM 500; 125 MG/1; MG/1
1 TABLET, FILM COATED ORAL 2 TIMES DAILY
Qty: 14 TABLET | Refills: 0 | Status: SHIPPED | OUTPATIENT
Start: 2022-02-25 | End: 2022-03-14

## 2022-03-07 ENCOUNTER — OFFICE VISIT (OUTPATIENT)
Dept: GASTROENTEROLOGY | Facility: CLINIC | Age: 57
End: 2022-03-07

## 2022-03-07 VITALS
SYSTOLIC BLOOD PRESSURE: 91 MMHG | TEMPERATURE: 94.6 F | BODY MASS INDEX: 22.28 KG/M2 | HEIGHT: 66 IN | WEIGHT: 138.6 LBS | DIASTOLIC BLOOD PRESSURE: 71 MMHG

## 2022-03-07 DIAGNOSIS — Z86.010 PERSONAL HISTORY OF COLONIC POLYPS: Primary | ICD-10-CM

## 2022-03-07 DIAGNOSIS — K52.9 CHRONIC DIARRHEA: ICD-10-CM

## 2022-03-07 DIAGNOSIS — D69.6 ACQUIRED THROMBOCYTOPENIA: ICD-10-CM

## 2022-03-07 DIAGNOSIS — K82.4 GALLBLADDER POLYP: ICD-10-CM

## 2022-03-07 PROCEDURE — 99213 OFFICE O/P EST LOW 20 MIN: CPT | Performed by: INTERNAL MEDICINE

## 2022-03-07 RX ORDER — EPINEPHRINE 0.3 MG/.3ML
INJECTION, SOLUTION INTRAMUSCULAR
COMMUNITY
Start: 2022-01-21

## 2022-03-07 RX ORDER — SODIUM CHLORIDE 9 MG/ML
70 INJECTION, SOLUTION INTRAVENOUS CONTINUOUS PRN
Status: CANCELLED | OUTPATIENT
Start: 2022-03-07

## 2022-03-07 RX ORDER — DEXAMETHASONE 4 MG/1
2 TABLET ORAL 2 TIMES DAILY
COMMUNITY
Start: 2022-01-19

## 2022-03-07 RX ORDER — DOXYCYCLINE HYCLATE 20 MG
20 TABLET ORAL 2 TIMES DAILY
COMMUNITY
Start: 2021-12-22 | End: 2022-04-25

## 2022-03-07 RX ORDER — CALCIUM CARBONATE 500(1250)
500 TABLET ORAL 2 TIMES DAILY
COMMUNITY

## 2022-03-07 NOTE — PROGRESS NOTES
Follow Up Note     Date: 2022   Patient Name: Jeni Newby  MRN: [id number]  : 1965     Referring Physician: Philipp Boogie MD    Chief Complaint:    Chief Complaint   Patient presents with   • Follow-up     Patient in office to f/u on colonoscopy.       Interval History:   3/7/2022  Jeni Newby is a 57 y.o. female who is here today for follow up after her colonoscopy.  She denies any significant diarrhea after she had a colonoscopy.    11/15/2021  Jeni Newby is a 56 y.o. female who is here today to establish care with Gastroenterology for to schedule her surveillance colonoscopy.  She has a chronic diarrhea abdominal bloating and fecal urgency.  She is much better after she changed her diet and followed the FODMAP diet and started on probiotics.     2018  The patient came back for follow visit today. Currently the patient has been having irregular bowel movements. She is been having alternating constipation and diarrhea. The patient has a history of constipation off and on for the last 3 months . Constipation is episodic. It may last for 4-5 days.Severity is mild. This is described as firm stools perhaps one bowel movement every other day. The patient is not taking laxatives. There is no associated rectal pain.episodes of constipation are followed by episodes of diarrhea. During the diarrheal episodes the patient may have 5-6 watery bowel movements a day. The diarrhea may last for a couple of days. The patient has been complaining of gas and bloated feeling as well. There is no history of overt GI bleed (Hematemesis, melena or hematochezia). She denies nausea or vomiting. There is no fever or chills. She denies recent weight loss. Her abdominal pain has significantly        Subjective      Past Medical History:   Past Medical History:   Diagnosis Date   • Allergic    • Asthma     ALLERGY INDUCED    • Body piercing     EARS   • Cancer (HCC) 2017    basal cell carcinoma,  "NOSE    • Colonic polyp     REPORTS HAD A \"CARPET GROWTH THAT REQUIRED A PORTION OF HER COLON BE REMOVED\"   • History of bronchitis    • History of migraine    • IBS (irritable bowel syndrome)    • Pneumonia    • Seasonal allergies    • Wears eyeglasses      Past Surgical History:   Past Surgical History:   Procedure Laterality Date   • COLON RESECTION N/A 9/7/2016    Procedure: LOW ANTERIOR COLON RESECTION LAPAROSCOPIC  WITH DAVINCI SI ROBOT VS EXTENDED LEFT COLECTOMY WITH TAP BLOCK;  Surgeon: Isra Melendez MD;  Location:  [facility];  Service:    • COLONOSCOPY      2016   • COLONOSCOPY N/A 12/22/2017    Procedure: COLONOSCOPY WITH BIOPSIES;  Surgeon: Tino Stapleton MD;  Location:  [facility];  Service:    • COLONOSCOPY N/A 1/6/2022    Procedure: COLONOSCOPY with biopsies and polypectomy x1 ;  Surgeon: Lidia Barker MD;  Location: [facility];  Service: Gastroenterology;  Laterality: N/A;   • PROCTOSCOPY N/A 9/7/2016    Procedure: PROCTOSCOPY RIGID;  Surgeon: Isra Melendez MD;  Location:  [facility];  Service:    • SKIN CANCER EXCISION     • WISDOM TOOTH EXTRACTION         Family History:   Family History   Problem Relation Age of Onset   • Ovarian cancer Maternal Grandmother 64   • Breast cancer Maternal Aunt 86   • Heart disease Mother        Social History:   Social History     Socioeconomic History   • Marital status: Single   Tobacco Use   • Smoking status: Never Smoker   • Smokeless tobacco: Never Used   Vaping Use   • Vaping Use: Never used   Substance and Sexual Activity   • Alcohol use: No   • Drug use: No   • Sexual activity: Defer       Medications:     Current Outpatient Medications:   •  Auvi-Q 0.3 MG/0.3ML solution auto-injector injection, INJECT AS NEEDED FOR SEVERE ALLERGIC REACTION INCLUDING ANAPHYLAXIS AS DIRECTED, Disp: , Rfl:   •  budesonide-formoterol (SYMBICORT) 80-4.5 MCG/ACT inhaler, Inhale 2 puffs 2 (Two) Times a Day. Symbicort, Disp: , Rfl:   •  Calcium " "Carb-Cholecalciferol (CALCIUM 1000 + D PO), Take  by mouth., Disp: , Rfl:   •  calcium carbonate, oyster shell, 500 MG tablet tablet, Take 500 mg by mouth 2 (Two) Times a Day., Disp: , Rfl:   •  doxycycline (PERIOSTAT) 20 MG tablet, Take 20 mg by mouth 2 (Two) Times a Day., Disp: , Rfl:   •  Flovent  MCG/ACT inhaler, Inhale 2 puffs 2 (Two) Times a Day., Disp: , Rfl:   •  Glucosamine-Chondroit-Vit C-Mn (GLUCOSAMINE 1500 COMPLEX PO), Take 1 tablet by mouth Daily., Disp: , Rfl:   •  Multiple Vitamins-Minerals (MULTIVITAMIN ADULT PO), Take 1 tablet by mouth daily., Disp: , Rfl:   •  PROAIR  (90 BASE) MCG/ACT inhaler, Inhale 1 puff Every 6 (Six) Hours As Needed for Wheezing., Disp: , Rfl:   •  Probiotic Product (PROBIOTIC-10 PO), , Disp: , Rfl:   •  amoxicillin-clavulanate (Augmentin) 500-125 MG per tablet, Take 1 tablet by mouth 2 (Two) Times a Day., Disp: 14 tablet, Rfl: 0    Allergies:   Allergies   Allergen Reactions   • Mushroom Extract Complex Swelling     Throat swelling   • Corn-Containing Products Other (See Comments)     Cramps and diarrhea       Review of Systems:   Review of Systems   Constitutional: Negative for appetite change, fatigue, fever and unexpected weight loss.   HENT: Negative for trouble swallowing.    Gastrointestinal: Negative for abdominal distention, abdominal pain, anal bleeding, blood in stool, constipation, diarrhea, nausea, rectal pain, vomiting, GERD and indigestion.       The following portions of the patient's history were reviewed and updated as appropriate: allergies, current medications, past family history, past medical history, past social history, past surgical history and problem list.    Objective     Physical Exam:  Vital Signs:   Vitals:    03/07/22 1437   BP: 91/71   BP Location: Right arm   Patient Position: Sitting   Cuff Size: Adult   Temp: 94.6 °F (34.8 °C)   Weight: 62.9 kg (138 lb 9.6 oz)   Height: 167.6 cm (66\")       Physical Exam  Constitutional:       " Appearance: Normal appearance.   HENT:      Head: Normocephalic and atraumatic.   Eyes:      Conjunctiva/sclera: Conjunctivae normal.   Abdominal:      General: Abdomen is flat. There is no distension.      Palpations: There is no mass.      Tenderness: There is no abdominal tenderness. There is no guarding or rebound.      Hernia: No hernia is present.   Musculoskeletal:      Cervical back: Normal range of motion and neck supple.   Neurological:      Mental Status: She is alert.         Results Review:   I reviewed the patient's new clinical results.    Admission on 01/06/2022, Discharged on 01/06/2022   Component Date Value Ref Range Status   • Case Report 01/06/2022    Final                    Value:Surgical Pathology Report                         Case: [id number]                                  Authorizing Provider:  Lidia Barker MD  Collected:           01/06/2022 09:27 AM          Ordering Location:     [facility]    Received:            01/06/2022 01:33 PM                                 [facility]                                                                    Pathologist:           Kristen Sykes DO                                                           Specimens:   1) - Small Intestine, Ileum, TI biopsy for chronic diarrhea                                         2) - Large Intestine, random colon biopsies for chronic diarrhea                                    3) - Large Intestine, Transverse Colon, transverse colon polyp                            • Final Diagnosis 01/06/2022    Final                    Value:This result contains rich text formatting which cannot be displayed here.      Mammo Screening Digital Tomosynthesis Bilateral With CAD    Result Date: 12/28/2021  No mammographic findings suspicious for malignancy.  RECOMMENDATION: 1. Continue annual screening mammography. 2. Consider genetic counseling due to the positive maternal family history of breast and ovarian  cancer.   BI-RADS CATEGORY 1, NEGATIVE.   CAD was utilized.  The standard false-negative rate of mammography is between 10% and 25%. Complex patterns or increased breast density will markedly elevate the false-negative rate of mammography.    A letter, in lay terminology, with the results of this exam will be mailed to the patient.   This report was finalized on 12/28/2021 10:18 AM by Dr. Tova Avalos MD.      Dated August 15, 2016 the patient underwent a colonoscopy by Dr. Melendez which revealed: Large polyp at 30 cm.  This has a globular component with central dimpling.  It also seems to be growing like a  carpet out over the mucosa as well.  The bulk of this was removed with hot snare polypectomy.  Dina ink was used to marianne the area.  Otherwise, the patient had a normal colon to the cecum with the exception of a long and redundant colon.  Internal hemorrhoids, grade 1.  Sphincter tone, normal.     Dated December 22, 2017 the patient underwent a colonoscopy to the terminal ileum which revealed: Left colonic anastomosis.  Scant erosion at the anastomotic site.  Biopsies of the anastomotic site.  Internal hemorrhoids.  No endoscopic evidence of colitis was seen.  Random biopsies were obtained from the colon upon withdrawal of the scope.  Scant vascular ectasia.  The colon was noted to be dilated and redundant.  Random colon biopsies revealed compatible with sessile serrated adenoma.  Benign lymphoid aggregates.  Colon, anastomotic site, biopsies revealed benign colonic mucosa with no diagnostic abdomen abnormalities    1/6/2022  - One 6 mm polyp in the mid transverse colon, removed with a cold snare. Resected and retrieved.  - Patent end-to-side colo-colonic anastomosis, characterized by healthy appearing mucosa.  - The examined portion of the ileum was normal. Biopsied for chronic diarrhea.  - Biopsies were taken with a cold forceps from the right colon, left colon and transverse colon for evaluation  of  microscopic colitis.    Assessment / Plan      1.  Adenomatous colon polyp  3/7/2022  She had a colonoscopy done on 1/6/2022 with a small 6 mm polyp removed from the transverse colon.  Specimen appears to be crusting the suction.  No good specimen formed for pathological evaluation.  She had a large advanced polyp removed in 2016 with a surgical excision.  We will repeat her colonoscopy in 5 years time with the high risk advanced polyp removed in the past and current polyp pathology unknown.    Repeat colonoscopy in 5 years time in 2027    11/15/2021  This patient had a advanced polyp partially removed endoscopically in 2016 followed by surgical resection.  Her last colonoscopy was in 2017 and had a 1 sessile serrated adenoma excised.  She is due for her surveillance colonoscopy next year in 2022.  We will schedule the colonoscopy for early next year     2. Diarrhea, unspecified type  3.  Suspected irritable bowel syndrome with diarrhea predominance   3/7/2022  She has been doing very well with the bowel movement now.  She is having mostly regular bowel movement after she had a colonoscopy.  We will continue her probiotic as before.  She did not get a stool test done.  As her symptoms improved now we will defer the stool test.    11/15/2021  Patient's history is very suggestive of IBS mixed type with diarrhea predominance.  Her symptoms have significantly improved after she followed the FODMAP diet and started on a probiotic.  Her celiac serology was negative in 2018. I have advised Imodium half tablets 1 tablet as needed for bowel movement anywhere more than 2 to 3/day    4.  Acquired thrombocytopenia  5.  Gallbladder polyp.  Patient has a thrombocytopenia at least for the past 5 years.  Etiology is unclear.  Her liver enzymes are normal.  Prior work-up does not indicate any signs of cirrhosis.  Patient is nonalcoholic.  Patient also had a known history of small gallbladder polyp.  Her prior ultrasound done in  2017 revealed 4 mm GB polyp.  Will get ultrasound abdomen to rule out any liver disease.  If normal will refer to hematology.        Follow Up:   Return in about 3 months (around 6/7/2022).    Lidia Barker MD  [facility] [place]  3/7/2022  19:53 EST     Please note that portions of this note may have been completed with a voice recognition program.

## 2022-03-11 ENCOUNTER — PATIENT MESSAGE (OUTPATIENT)
Dept: INTERNAL MEDICINE | Facility: CLINIC | Age: 57
End: 2022-03-11

## 2022-03-14 ENCOUNTER — OFFICE VISIT (OUTPATIENT)
Dept: INTERNAL MEDICINE | Facility: CLINIC | Age: 57
End: 2022-03-14

## 2022-03-14 VITALS
HEIGHT: 66 IN | HEART RATE: 84 BPM | SYSTOLIC BLOOD PRESSURE: 110 MMHG | BODY MASS INDEX: 22.37 KG/M2 | OXYGEN SATURATION: 99 % | DIASTOLIC BLOOD PRESSURE: 70 MMHG | TEMPERATURE: 97.7 F

## 2022-03-14 DIAGNOSIS — H92.09 EAR ACHE: Primary | ICD-10-CM

## 2022-03-14 PROCEDURE — 99213 OFFICE O/P EST LOW 20 MIN: CPT | Performed by: INTERNAL MEDICINE

## 2022-03-14 RX ORDER — KETOCONAZOLE 20 MG/ML
SHAMPOO TOPICAL
COMMUNITY
Start: 2021-12-22

## 2022-03-14 NOTE — PROGRESS NOTES
"Subjective  Jeni Newby is a 57 y.o. female    HPI 3-day history of discomfort over her left eyebrow with occasional popping sensation in her left ear.  Concerned that she may have some ear congestion.  Denies any hearing difficulties no drainage    The following portions of the patient's history were reviewed and updated as appropriate: allergies, current medications, past family history, past medical history, past social history, past surgical history, and problem list.     Review of Systems   Constitutional: Negative.  Negative for activity change, appetite change, fatigue and fever.   HENT: Negative for congestion, ear discharge, ear pain and trouble swallowing.    Eyes: Negative for photophobia and visual disturbance.   Respiratory: Negative for cough and shortness of breath.    Cardiovascular: Negative for chest pain and palpitations.   Gastrointestinal: Negative for abdominal distention, abdominal pain, constipation, diarrhea, nausea and vomiting.   Endocrine: Negative.    Genitourinary: Negative for dysuria, hematuria and urgency.   Musculoskeletal: Negative for arthralgias, back pain, joint swelling and myalgias.   Skin: Negative for color change and rash.   Allergic/Immunologic: Negative.    Neurological: Positive for headaches. Negative for dizziness, weakness and light-headedness.   Hematological: Negative for adenopathy. Does not bruise/bleed easily.   Psychiatric/Behavioral: Negative for agitation, confusion and dysphoric mood. The patient is not nervous/anxious.        Visit Vitals  /70   Pulse 84   Temp 97.7 °F (36.5 °C) (Infrared)   Ht 167.6 cm (66\")   LMP  (LMP Unknown) Comment: PATIENT REPORTS LMP 2-3 YEARS AGO. DENIES ANY HX OF ABLATION.   SpO2 99%   BMI 22.37 kg/m²       Objective  Physical Exam  Constitutional:       General: She is not in acute distress.     Appearance: She is well-developed.   HENT:      Nose: Nose normal.   Eyes:      General: No scleral icterus.     " Conjunctiva/sclera: Conjunctivae normal.   Neck:      Thyroid: No thyromegaly.      Trachea: No tracheal deviation.   Cardiovascular:      Rate and Rhythm: Normal rate and regular rhythm.      Heart sounds: No murmur heard.    No friction rub.   Pulmonary:      Effort: No respiratory distress.      Breath sounds: No wheezing or rales.   Abdominal:      General: There is no distension.      Palpations: Abdomen is soft. There is no mass.      Tenderness: There is no abdominal tenderness. There is no guarding.   Musculoskeletal:         General: No deformity. Normal range of motion.   Lymphadenopathy:      Cervical: No cervical adenopathy.   Skin:     General: Skin is warm and dry.      Findings: No erythema or rash.   Neurological:      Mental Status: She is alert and oriented to person, place, and time.      Cranial Nerves: No cranial nerve deficit.      Coordination: Coordination normal.      Deep Tendon Reflexes: Reflexes are normal and symmetric.   Psychiatric:         Behavior: Behavior normal.         Thought Content: Thought content normal.         Judgment: Judgment normal.         Diagnoses and all orders for this visit:    Ear ache exam unremarkable.  Discussed auto insufflation.  Consider steroid no spray    Other orders  -     ketoconazole (NIZORAL) 2 % shampoo; SHAMPOO AND LATHER TO SCALP AND BEHIND EARS FOR 5-10 MINUTES 2-4 TIMES A WEEK AS NEEDED

## 2022-03-14 NOTE — TELEPHONE ENCOUNTER
"From: Jeni Newby  To: Philipp Boogie MD  Sent: 3/11/2022 1:23 PM EST  Subject: RE: pain above left eye and eye watering    Hi Dr. Boogie,    I have pain at my left brow bone. My left eye is watery and painful. I think it might be the sinus cavity above my left eye. My eyeball hurts, as well.    I leave a week from today for a ten hour flight to [place]. I really need to get this cleared up before the flight. I think the cabin pressure would be terrible!    Could you or Nurse Dayana call me this afternoon, please?    Thanks,  Jeni \"Génesis\"Odin  "

## 2022-04-13 ENCOUNTER — HOSPITAL ENCOUNTER (OUTPATIENT)
Dept: ULTRASOUND IMAGING | Facility: HOSPITAL | Age: 57
Discharge: HOME | End: 2022-04-13
Admitting: INTERNAL MEDICINE

## 2022-04-13 DIAGNOSIS — D69.6 ACQUIRED THROMBOCYTOPENIA: ICD-10-CM

## 2022-04-13 DIAGNOSIS — K82.4 GALLBLADDER POLYP: ICD-10-CM

## 2022-04-13 PROCEDURE — 76700 US EXAM ABDOM COMPLETE: CPT

## 2022-04-25 ENCOUNTER — TELEPHONE (OUTPATIENT)
Dept: INTERNAL MEDICINE | Facility: CLINIC | Age: 57
End: 2022-04-25

## 2022-04-25 ENCOUNTER — OFFICE VISIT (OUTPATIENT)
Dept: INTERNAL MEDICINE | Facility: CLINIC | Age: 57
End: 2022-04-25

## 2022-04-25 VITALS
TEMPERATURE: 98.2 F | WEIGHT: 138 LBS | HEIGHT: 66 IN | BODY MASS INDEX: 22.18 KG/M2 | DIASTOLIC BLOOD PRESSURE: 70 MMHG | OXYGEN SATURATION: 98 % | HEART RATE: 91 BPM | SYSTOLIC BLOOD PRESSURE: 106 MMHG

## 2022-04-25 DIAGNOSIS — J01.00 ACUTE NON-RECURRENT MAXILLARY SINUSITIS: Primary | ICD-10-CM

## 2022-04-25 PROCEDURE — 99213 OFFICE O/P EST LOW 20 MIN: CPT | Performed by: INTERNAL MEDICINE

## 2022-04-25 RX ORDER — LEVOCETIRIZINE DIHYDROCHLORIDE 5 MG/1
5 TABLET, FILM COATED ORAL EVERY EVENING
Qty: 30 TABLET | Refills: 2 | Status: SHIPPED | OUTPATIENT
Start: 2022-04-25 | End: 2022-09-12

## 2022-04-25 RX ORDER — AZELASTINE 1 MG/ML
1 SPRAY, METERED NASAL 2 TIMES DAILY
Qty: 30 ML | Refills: 12 | Status: SHIPPED | OUTPATIENT
Start: 2022-04-25 | End: 2022-09-12

## 2022-04-25 RX ORDER — MONTELUKAST SODIUM 10 MG/1
10 TABLET ORAL NIGHTLY
Qty: 30 TABLET | Refills: 0 | Status: SHIPPED | OUTPATIENT
Start: 2022-04-25 | End: 2022-09-12

## 2022-04-25 NOTE — TELEPHONE ENCOUNTER
Caller: Odin Jeniopal Bonilla    Relationship: Self    Best call back number:  [phone]    What medications are you currently taking:   Current Outpatient Medications on File Prior to Visit   Medication Sig Dispense Refill   • Auvi-Q 0.3 MG/0.3ML solution auto-injector injection INJECT AS NEEDED FOR SEVERE ALLERGIC REACTION INCLUDING ANAPHYLAXIS AS DIRECTED     • azelastine (ASTELIN) 0.1 % nasal spray 1 spray into the nostril(s) as directed by provider 2 (Two) Times a Day. Use in each nostril as directed 30 mL 12   • budesonide-formoterol (SYMBICORT) 80-4.5 MCG/ACT inhaler Inhale 2 puffs 2 (Two) Times a Day. Symbicort     • Calcium Carb-Cholecalciferol (CALCIUM 1000 + D PO) Take  by mouth.     • calcium carbonate, oyster shell, 500 MG tablet tablet Take 500 mg by mouth 2 (Two) Times a Day.     • Flovent  MCG/ACT inhaler Inhale 2 puffs 2 (Two) Times a Day.     • Glucosamine-Chondroit-Vit C-Mn (GLUCOSAMINE 1500 COMPLEX PO) Take 1 tablet by mouth Daily.     • ketoconazole (NIZORAL) 2 % shampoo SHAMPOO AND LATHER TO SCALP AND BEHIND EARS FOR 5-10 MINUTES 2-4 TIMES A WEEK AS NEEDED     • levocetirizine (Xyzal) 5 MG tablet Take 1 tablet by mouth Every Evening. 30 tablet 2   • montelukast (Singulair) 10 MG tablet Take 1 tablet by mouth Every Night. 30 tablet 0   • Multiple Vitamins-Minerals (MULTIVITAMIN ADULT PO) Take 1 tablet by mouth daily.     • PROAIR  (90 BASE) MCG/ACT inhaler Inhale 1 puff Every 6 (Six) Hours As Needed for Wheezing.     • Probiotic Product (PROBIOTIC-10 PO)      • triamcinolone (KENALOG) 0.1 % ointment Apply 1 application topically to the appropriate area as directed 2 (Two) Times a Day. 15 g 0   • [DISCONTINUED] doxycycline (PERIOSTAT) 20 MG tablet Take 20 mg by mouth 2 (Two) Times a Day.       No current facility-administered medications on file prior to visit.            Who prescribed you this medication: DR CUEVAS    What are your concerns: THE PHARMACY SAID THE NASAL SPRAY  JUST WRITTEN BY DR CUEVAS IS BACKORDERED INDEFINITELY.  PLEASE WRITE SOMETHING ELSE.      NASACORT GIVES [facility] NOSEBLEEDS     PLEASE CALL [facility] WHEN SENT IN

## 2022-04-25 NOTE — PROGRESS NOTES
"Subjective  Jeni Newby is a 57 y.o. female    HPI 2-day history of cough postnasal drainage without fever or chills history of asthmatic bronchitis for which she is on Symbicort and albuterol as needed.  Denies fever or chills    The following portions of the patient's history were reviewed and updated as appropriate: allergies, current medications, past family history, past medical history, past social history, past surgical history, and problem list.     Review of Systems   Constitutional: Negative.  Negative for activity change, appetite change, fatigue and fever.   HENT: Positive for congestion and sore throat. Negative for ear discharge, ear pain and trouble swallowing.    Eyes: Negative for photophobia and visual disturbance.   Respiratory: Positive for cough. Negative for shortness of breath.    Cardiovascular: Negative for chest pain and palpitations.   Gastrointestinal: Negative for abdominal distention, abdominal pain, constipation, diarrhea, nausea and vomiting.   Endocrine: Negative.    Genitourinary: Negative for dysuria, hematuria and urgency.   Musculoskeletal: Negative for arthralgias, back pain, joint swelling and myalgias.   Skin: Negative for color change and rash.   Allergic/Immunologic: Negative.    Neurological: Negative for dizziness, weakness, light-headedness and headaches.   Hematological: Negative for adenopathy. Does not bruise/bleed easily.   Psychiatric/Behavioral: Negative for agitation, confusion and dysphoric mood. The patient is not nervous/anxious.        Visit Vitals  /70   Pulse 91   Temp 98.2 °F (36.8 °C) (Infrared)   Ht 167.6 cm (66\")   Wt 62.6 kg (138 lb)   LMP  (LMP Unknown) Comment: PATIENT REPORTS LMP 2-3 YEARS AGO. DENIES ANY HX OF ABLATION.   SpO2 98%   BMI 22.27 kg/m²       Objective  Physical Exam  Constitutional:       General: She is not in acute distress.     Appearance: She is well-developed.   HENT:      Nose: Nose normal.   Eyes:      General: No " scleral icterus.     Conjunctiva/sclera: Conjunctivae normal.   Neck:      Thyroid: No thyromegaly.      Trachea: No tracheal deviation.   Cardiovascular:      Rate and Rhythm: Normal rate and regular rhythm.      Heart sounds: No murmur heard.    No friction rub.   Pulmonary:      Effort: No respiratory distress.      Breath sounds: No wheezing or rales.   Abdominal:      General: There is no distension.      Palpations: Abdomen is soft. There is no mass.      Tenderness: There is no abdominal tenderness. There is no guarding.   Musculoskeletal:         General: No deformity. Normal range of motion.   Lymphadenopathy:      Cervical: No cervical adenopathy.   Skin:     General: Skin is warm and dry.      Findings: No erythema or rash.   Neurological:      Mental Status: She is alert and oriented to person, place, and time.      Cranial Nerves: No cranial nerve deficit.      Coordination: Coordination normal.      Deep Tendon Reflexes: Reflexes are normal and symmetric.   Psychiatric:         Behavior: Behavior normal.         Thought Content: Thought content normal.         Judgment: Judgment normal.         Diagnoses and all orders for this visit:    Acute non-recurrent maxillary sinusitis does not tolerate steroid no spray added on Astelin along with Xyzal.  Advised warm water with salt gargles    Other orders  -     montelukast (Singulair) 10 MG tablet; Take 1 tablet by mouth Every Night.  -     levocetirizine (Xyzal) 5 MG tablet; Take 1 tablet by mouth Every Evening.

## 2022-09-12 ENCOUNTER — OFFICE VISIT (OUTPATIENT)
Dept: INTERNAL MEDICINE | Facility: CLINIC | Age: 57
End: 2022-09-12

## 2022-09-12 VITALS
SYSTOLIC BLOOD PRESSURE: 108 MMHG | TEMPERATURE: 97.5 F | BODY MASS INDEX: 21.86 KG/M2 | WEIGHT: 136 LBS | OXYGEN SATURATION: 98 % | HEIGHT: 66 IN | DIASTOLIC BLOOD PRESSURE: 68 MMHG | HEART RATE: 71 BPM

## 2022-09-12 DIAGNOSIS — Z00.00 ROUTINE GENERAL MEDICAL EXAMINATION AT A HEALTH CARE FACILITY: Primary | ICD-10-CM

## 2022-09-12 PROCEDURE — 99396 PREV VISIT EST AGE 40-64: CPT | Performed by: INTERNAL MEDICINE

## 2022-09-12 RX ORDER — BENZOYL PEROXIDE 50 MG/G
CREAM TOPICAL
COMMUNITY
Start: 2022-08-26

## 2022-09-12 RX ORDER — IVERMECTIN 10 MG/G
CREAM TOPICAL
COMMUNITY
Start: 2022-09-09

## 2022-09-12 NOTE — PROGRESS NOTES
"Chief Complaint   Patient presents with   • Annual Exam   • Cough     Wet cough since Aug 28th-a visit to the [facility], that remains       Jeni Newby is a 57 y.o. female and is here for a comprehensive physical exam. The patient reports problems - cough.     History:  LMP: No LMP recorded (lmp unknown). Patient is postmenopausal.  Menopause at 51 years  Last pap date: 20  Abnormal pap? no  : 0  Para: 0    Do you take any herbs or supplements that were not prescribed by a doctor? yes  Are you taking calcium supplements? yes  Are you taking aspirin daily? no      Health Habits:  Dental Exam. up to date  Eye Exam. up to date  Exercise: 5 times/week.  Current exercise activities include: cardiovascular workout on exercise equipment, weightlifting and yoga    Health Maintenance   Topic Date Due   • COVID-19 Vaccine (4 - Booster for Pfizer series) 02/15/2022   • LIPID PANEL  2022   • ANNUAL PHYSICAL  09/10/2022   • INFLUENZA VACCINE  10/01/2022   • PAP SMEAR  2022   • MAMMOGRAM  2023   • TDAP/TD VACCINES (2 - Td or Tdap) 2029   • COLORECTAL CANCER SCREENING  2032   • HEPATITIS C SCREENING  Completed   • ZOSTER VACCINE  Completed   • Pneumococcal Vaccine 0-64  Aged Out       PMH, PSH, SocHx, FamHx, Allergies, and Medications: Reviewed and updated in the Visit Navigator.     Allergies   Allergen Reactions   • Mushroom Extract Complex Swelling     Throat swelling   • Corn-Containing Products Other (See Comments)     Cramps and diarrhea     Past Medical History:   Diagnosis Date   • Allergic    • Asthma     ALLERGY INDUCED    • Body piercing     EARS   • Cancer (HCC) 2017    basal cell carcinoma, NOSE    • Colonic polyp     REPORTS HAD A \"CARPET GROWTH THAT REQUIRED A PORTION OF HER COLON BE REMOVED\"   • History of bronchitis    • History of migraine    • IBS (irritable bowel syndrome)    • Pneumonia    • Seasonal allergies    • Wears eyeglasses      Past Surgical " History:   Procedure Laterality Date   • COLON RESECTION N/A 9/7/2016    Procedure: LOW ANTERIOR COLON RESECTION LAPAROSCOPIC  WITH DAVINCI SI ROBOT VS EXTENDED LEFT COLECTOMY WITH TAP BLOCK;  Surgeon: Isra Melendez MD;  Location:  [facility];  Service:    • COLONOSCOPY      2016   • COLONOSCOPY N/A 12/22/2017    Procedure: COLONOSCOPY WITH BIOPSIES;  Surgeon: Tino Stapleton MD;  Location: [facility] ENDOSCOPY;  Service:    • COLONOSCOPY N/A 1/6/2022    Procedure: COLONOSCOPY with biopsies and polypectomy x1 ;  Surgeon: Lidia Barker MD;  Location: [facility] ENDOSCOPY;  Service: Gastroenterology;  Laterality: N/A;   • PROCTOSCOPY N/A 9/7/2016    Procedure: PROCTOSCOPY RIGID;  Surgeon: Isra Melendez MD;  Location:  [facility];  Service:    • SKIN CANCER EXCISION     • WISDOM TOOTH EXTRACTION       Social History     Socioeconomic History   • Marital status: Single   Tobacco Use   • Smoking status: Never Smoker   • Smokeless tobacco: Never Used   Vaping Use   • Vaping Use: Never used   Substance and Sexual Activity   • Alcohol use: No   • Drug use: No   • Sexual activity: Defer     Family History   Problem Relation Age of Onset   • Ovarian cancer Maternal Grandmother 64   • Breast cancer Maternal Aunt 86   • Heart disease Mother        Review of Systems  Review of Systems   Constitutional: Negative.  Negative for activity change, appetite change, fatigue and fever.   HENT: Negative for congestion, ear discharge, ear pain and trouble swallowing.    Eyes: Negative for photophobia and visual disturbance.   Respiratory: Negative for cough and shortness of breath.    Cardiovascular: Negative for chest pain and palpitations.   Gastrointestinal: Negative for abdominal distention, abdominal pain, constipation, diarrhea, nausea and vomiting.   Endocrine: Negative.    Genitourinary: Negative for dysuria, hematuria and urgency.   Musculoskeletal: Negative for arthralgias, back pain, joint swelling and myalgias.   Skin: Negative  "for color change and rash.   Allergic/Immunologic: Negative.    Neurological: Negative for dizziness, weakness, light-headedness and headaches.   Hematological: Negative for adenopathy. Does not bruise/bleed easily.   Psychiatric/Behavioral: Negative for agitation, confusion and dysphoric mood. The patient is not nervous/anxious.        Vitals:    09/12/22 0914   BP: 108/68   Pulse: 71   Temp: 97.5 °F (36.4 °C)   SpO2: 98%       Objective   /68   Pulse 71   Temp 97.5 °F (36.4 °C)   Ht 167.6 cm (66\")   Wt 61.7 kg (136 lb)   LMP  (LMP Unknown) Comment: PATIENT REPORTS LMP 2-3 YEARS AGO. DENIES ANY HX OF ABLATION.  SpO2 98%   BMI 21.95 kg/m²     Physical Exam  Constitutional:       General: She is not in acute distress.     Appearance: She is well-developed.   HENT:      Nose: Nose normal.   Eyes:      General: No scleral icterus.     Conjunctiva/sclera: Conjunctivae normal.   Neck:      Thyroid: No thyromegaly.      Trachea: No tracheal deviation.   Cardiovascular:      Rate and Rhythm: Normal rate and regular rhythm.      Heart sounds: No murmur heard.    No friction rub.   Pulmonary:      Effort: No respiratory distress.      Breath sounds: No wheezing or rales.   Abdominal:      General: There is no distension.      Palpations: Abdomen is soft. There is no mass.      Tenderness: There is no abdominal tenderness. There is no guarding.   Musculoskeletal:         General: Deformity present. Normal range of motion.   Lymphadenopathy:      Cervical: No cervical adenopathy.   Skin:     General: Skin is warm and dry.      Findings: No erythema or rash.   Neurological:      Mental Status: She is alert and oriented to person, place, and time.      Cranial Nerves: No cranial nerve deficit.      Coordination: Coordination normal.      Deep Tendon Reflexes: Reflexes are normal and symmetric.   Psychiatric:         Behavior: Behavior normal.         Thought Content: Thought content normal.         Judgment: " Judgment normal.         The CVD Risk score (MARRY'Agoino, et al., 2008) failed to calculate for the following reasons:    Cannot find a previous HDL lab    Cannot find a previous total cholesterol lab    Lab Results   Component Value Date    CHLPL 178 08/30/2019    TRIG 63 08/30/2019    HDL 62 (H) 08/30/2019     (H) 09/09/2021     Glucose   Date Value Ref Range Status   09/09/2021 81 65 - 99 mg/dL Final   09/19/2016 79 70 - 100 mg/dL Final     BUN   Date Value Ref Range Status   09/09/2021 14 6 - 20 mg/dL Final   09/19/2016 14 9 - 23 mg/dL Final     Creatinine   Date Value Ref Range Status   09/09/2021 0.71 0.57 - 1.00 mg/dL Final   09/19/2016 0.60 0.60 - 1.30 mg/dL Final     Sodium   Date Value Ref Range Status   09/09/2021 143 136 - 145 mmol/L Final   09/19/2016 137 132 - 146 mmol/L Final     Potassium   Date Value Ref Range Status   09/09/2021 4.5 3.5 - 5.2 mmol/L Final   09/19/2016 4.6 3.5 - 5.5 mmol/L Final     Chloride   Date Value Ref Range Status   09/09/2021 105 98 - 107 mmol/L Final   09/19/2016 98 (L) 99 - 109 mmol/L Final     CO2   Date Value Ref Range Status   09/19/2016 30.0 20.0 - 31.0 mmol/L Final     Total CO2   Date Value Ref Range Status   09/09/2021 26.4 22.0 - 29.0 mmol/L Final     Calcium   Date Value Ref Range Status   09/09/2021 9.6 8.6 - 10.5 mg/dL Final   09/19/2016 9.6 8.7 - 10.4 mg/dL Final     Total Protein   Date Value Ref Range Status   09/19/2016 8.0 5.7 - 8.2 g/dL Final     Albumin   Date Value Ref Range Status   09/09/2021 4.60 3.50 - 5.20 g/dL Final   09/19/2016 4.30 3.20 - 4.80 g/dL Final     ALT (SGPT)   Date Value Ref Range Status   09/09/2021 12 1 - 33 U/L Final   09/19/2016 19 7 - 40 U/L Final     AST (SGOT)   Date Value Ref Range Status   09/09/2021 19 1 - 32 U/L Final   09/19/2016 21 0 - 33 U/L Final     Alkaline Phosphatase   Date Value Ref Range Status   09/09/2021 69 39 - 117 U/L Final   09/19/2016 103 (H) 25 - 100 U/L Final     Total Bilirubin   Date Value Ref  Range Status   09/09/2021 0.4 0.0 - 1.2 mg/dL Final   09/19/2016 0.5 0.3 - 1.2 mg/dL Final     eGFR Non  Am   Date Value Ref Range Status   09/09/2021 85 >60 mL/min/1.73 Final     Comment:     GFR Normal >60  Chronic Kidney Disease <60  Kidney Failure <15       eGFR Non  Amer   Date Value Ref Range Status   09/19/2016 105 >60 mL/min/1.73 Final     A/G Ratio   Date Value Ref Range Status   09/09/2021 1.9 g/dL Final     BUN/Creatinine Ratio   Date Value Ref Range Status   09/09/2021 19.7 7.0 - 25.0 Final   09/19/2016 23.3 7.0 - 25.0 Final     Anion Gap   Date Value Ref Range Status   09/19/2016 9.0 3.0 - 11.0 mmol/L Final     Lab Results   Component Value Date    WBC 4.33 09/09/2021    HGB 14.8 09/09/2021    HCT 45.0 09/09/2021    MCV 89.8 09/09/2021     (L) 09/09/2021        Assessment & Plan   1. Healthy female exam.   2. Patient Counseling: Including but not Limited to the following, when appropriate:  --Nutrition: Stressed importance of moderation in sodium/caffeine intake, saturated fat and cholesterol, caloric balance, sufficient intake of fresh fruits, vegetables, fiber, calcium, iron,--Discussed the issue of estrogen replacement, calcium supplement,  --Exercise: Stressed the importance of regular exercise.   --Substance Abuse: Discussed cessation/primary prevention of tobacco, alcohol, or other drug use; driving or other dangerous activities under the influence; availability of treatment for abuse, as indicated based on social history.    --Sexuality: Discussed sexually transmitted diseases, partner selection, use of condoms, avoidance of unintended pregnancy  and contraceptive alternatives.   --Injury prevention: Discussed safety belts, safety helmets, smoke detector, smoking near bedding or upholstery.   --Dental health: Discussed importance of regular tooth brushing, flossing, and dental visits.  --Immunizations reviewed.  --Discussed benefits of colon cancer screening.      3.  Discussed the patient's BMI with her.  The BMI is above average; BMI management plan is completed  4. No follow-ups on file.  5. Age-appropriate Screening Scheduled  6. There are no Patient Instructions on file for this visit.    Assessment & Plan     Diagnoses and all orders for this visit:    1. Routine general medical examination at a health care facility (Primary)

## 2022-09-13 LAB
ALBUMIN SERPL-MCNC: 4.7 G/DL (ref 3.5–5.2)
ALBUMIN/GLOB SERPL: 1.8 G/DL
ALP SERPL-CCNC: 80 U/L (ref 39–117)
ALT SERPL-CCNC: 11 U/L (ref 1–33)
AST SERPL-CCNC: 17 U/L (ref 1–32)
BILIRUB SERPL-MCNC: 0.4 MG/DL (ref 0–1.2)
BUN SERPL-MCNC: 10 MG/DL (ref 6–20)
BUN/CREAT SERPL: 14.1 (ref 7–25)
CALCIUM SERPL-MCNC: 9.7 MG/DL (ref 8.6–10.5)
CHLORIDE SERPL-SCNC: 103 MMOL/L (ref 98–107)
CO2 SERPL-SCNC: 29.6 MMOL/L (ref 22–29)
CREAT SERPL-MCNC: 0.71 MG/DL (ref 0.57–1)
EGFRCR-CYS SERPLBLD CKD-EPI 2021: 98.7 ML/MIN/1.73
ERYTHROCYTE [DISTWIDTH] IN BLOOD BY AUTOMATED COUNT: 12.3 % (ref 12.3–15.4)
GLOBULIN SER CALC-MCNC: 2.6 GM/DL
GLUCOSE SERPL-MCNC: 88 MG/DL (ref 65–99)
HCT VFR BLD AUTO: 44 % (ref 34–46.6)
HGB BLD-MCNC: 14.5 G/DL (ref 12–15.9)
LDLC SERPL DIRECT ASSAY-MCNC: 124 MG/DL (ref 0–99)
MCH RBC QN AUTO: 29.9 PG (ref 26.6–33)
MCHC RBC AUTO-ENTMCNC: 33 G/DL (ref 31.5–35.7)
MCV RBC AUTO: 90.7 FL (ref 79–97)
PLATELET # BLD AUTO: 139 10*3/MM3 (ref 140–450)
POTASSIUM SERPL-SCNC: 4.5 MMOL/L (ref 3.5–5.2)
PROT SERPL-MCNC: 7.3 G/DL (ref 6–8.5)
RBC # BLD AUTO: 4.85 10*6/MM3 (ref 3.77–5.28)
SODIUM SERPL-SCNC: 143 MMOL/L (ref 136–145)
WBC # BLD AUTO: 4.02 10*3/MM3 (ref 3.4–10.8)

## 2022-09-19 ENCOUNTER — OFFICE VISIT (OUTPATIENT)
Dept: GASTROENTEROLOGY | Facility: CLINIC | Age: 57
End: 2022-09-19

## 2022-09-19 VITALS
DIASTOLIC BLOOD PRESSURE: 58 MMHG | RESPIRATION RATE: 16 BRPM | HEIGHT: 66 IN | WEIGHT: 137 LBS | BODY MASS INDEX: 22.02 KG/M2 | TEMPERATURE: 97.8 F | SYSTOLIC BLOOD PRESSURE: 93 MMHG | HEART RATE: 72 BPM

## 2022-09-19 DIAGNOSIS — D69.6 ACQUIRED THROMBOCYTOPENIA: Primary | ICD-10-CM

## 2022-09-19 DIAGNOSIS — K82.4 POLYP OF GALLBLADDER: ICD-10-CM

## 2022-09-19 DIAGNOSIS — K52.9 CHRONIC DIARRHEA: ICD-10-CM

## 2022-09-19 DIAGNOSIS — K58.0 IRRITABLE BOWEL SYNDROME WITH DIARRHEA: ICD-10-CM

## 2022-09-19 PROCEDURE — 99214 OFFICE O/P EST MOD 30 MIN: CPT | Performed by: INTERNAL MEDICINE

## 2022-09-19 NOTE — PROGRESS NOTES
"     Follow Up Note     Date: 2022   Patient Name: Jeni Newby  MRN: [id number]  : 1965     Referring Physician: Philipp Boogie MD    Chief Complaint:    Chief Complaint   Patient presents with   • Follow-up   • Gallbladder polyp   • Diarrhea   • Acquired thrombocytopenia   • Hx Colon polyps       Interval History:   2022  Jeni Newby is a 57 y.o. female who is here today for follow up for follow-up after her recent ultrasound abdomen.  He has been doing very well with her bowel symptoms.  Denies any recent flareups.  Has been having 1-2 soft bowel movement without any significant abdominal pain.     2018  The patient came back for follow visit today. Currently the patient has been having irregular bowel movements. She is been having alternating constipation and diarrhea. The patient has a history of constipation off and on for the last 3 months . Constipation is episodic. It may last for 4-5 days.Severity is mild. This is described as firm stools perhaps one bowel movement every other day. The patient is not taking laxatives. There is no associated rectal pain.episodes of constipation are followed by episodes of diarrhea. During the diarrheal episodes the patient may have 5-6 watery bowel movements a day. The diarrhea may last for a couple of days. The patient has been complaining of gas and bloated feeling as well. There is no history of overt GI bleed (Hematemesis, melena or hematochezia). She denies nausea or vomiting. There is no fever or chills. She denies recent weight loss. Her abdominal pain has significantly     Subjective      Past Medical History:   Past Medical History:   Diagnosis Date   • Allergic    • Asthma     ALLERGY INDUCED    • Body piercing     EARS   • Cancer (HCC) 2017    basal cell carcinoma, NOSE    • Colonic polyp     REPORTS HAD A \"CARPET GROWTH THAT REQUIRED A PORTION OF HER COLON BE REMOVED\"   • History of bronchitis    • History of migraine    • " IBS (irritable bowel syndrome)    • Pneumonia    • Seasonal allergies    • Wears eyeglasses      Past Surgical History:   Past Surgical History:   Procedure Laterality Date   • COLON RESECTION N/A 9/7/2016    Procedure: LOW ANTERIOR COLON RESECTION LAPAROSCOPIC  WITH DAVINCI SI ROBOT VS EXTENDED LEFT COLECTOMY WITH TAP BLOCK;  Surgeon: Isra Melendez MD;  Location:  [facility];  Service:    • COLONOSCOPY      2016   • COLONOSCOPY N/A 12/22/2017    Procedure: COLONOSCOPY WITH BIOPSIES;  Surgeon: Tino Stapleton MD;  Location:  [facility];  Service:    • COLONOSCOPY N/A 1/6/2022    Procedure: COLONOSCOPY with biopsies and polypectomy x1 ;  Surgeon: Lidia Barker MD;  Location:  [facility];  Service: Gastroenterology;  Laterality: N/A;   • PROCTOSCOPY N/A 9/7/2016    Procedure: PROCTOSCOPY RIGID;  Surgeon: Isra Melendez MD;  Location:  [facility];  Service:    • SKIN CANCER EXCISION     • WISDOM TOOTH EXTRACTION         Family History:   Family History   Problem Relation Age of Onset   • Heart disease Mother    • Breast cancer Maternal Aunt 86   • Ovarian cancer Maternal Grandmother 64   • Colon cancer Neg Hx        Social History:   Social History     Socioeconomic History   • Marital status: Single   Tobacco Use   • Smoking status: Never Smoker   • Smokeless tobacco: Never Used   Vaping Use   • Vaping Use: Never used   Substance and Sexual Activity   • Alcohol use: No   • Drug use: No   • Sexual activity: Defer       Medications:     Current Outpatient Medications:   •  Auvi-Q 0.3 MG/0.3ML solution auto-injector injection, INJECT AS NEEDED FOR SEVERE ALLERGIC REACTION INCLUDING ANAPHYLAXIS AS DIRECTED, Disp: , Rfl:   •  budesonide-formoterol (SYMBICORT) 80-4.5 MCG/ACT inhaler, Inhale 2 puffs 2 (Two) Times a Day. Symbicort, Disp: , Rfl:   •  calcium carbonate, oyster shell, 500 MG tablet tablet, Take 500 mg by mouth 2 (Two) Times a Day., Disp: , Rfl:   •  Epsolay 5 % cream, , Disp: , Rfl:   •  Flovent  " MCG/ACT inhaler, Inhale 2 puffs 2 (Two) Times a Day., Disp: , Rfl:   •  Glucosamine-Chondroit-Vit C-Mn (GLUCOSAMINE 1500 COMPLEX PO), Take 1 tablet by mouth Daily., Disp: , Rfl:   •  ketoconazole (NIZORAL) 2 % shampoo, SHAMPOO AND LATHER TO SCALP AND BEHIND EARS FOR 5-10 MINUTES 2-4 TIMES A WEEK AS NEEDED, Disp: , Rfl:   •  Multiple Vitamins-Minerals (MULTIVITAMIN ADULT PO), Take 1 tablet by mouth daily., Disp: , Rfl:   •  Probiotic Product (PROBIOTIC-10 PO), , Disp: , Rfl:   •  Soolantra 1 % cream, , Disp: , Rfl:   •  triamcinolone (KENALOG) 0.1 % ointment, Apply 1 application topically to the appropriate area as directed 2 (Two) Times a Day., Disp: 15 g, Rfl: 0    Allergies:   Allergies   Allergen Reactions   • Mushroom Extract Complex Swelling     Throat swelling   • Corn-Containing Products Other (See Comments)     Cramps and diarrhea       Review of Systems:   Review of Systems   Constitutional: Negative for appetite change, fatigue, fever and unexpected weight loss.   HENT: Negative for trouble swallowing.    Gastrointestinal: Positive for diarrhea. Negative for abdominal distention, abdominal pain, anal bleeding, blood in stool, constipation, nausea, rectal pain, vomiting, GERD and indigestion.       The following portions of the patient's history were reviewed and updated as appropriate: allergies, current medications, past family history, past medical history, past social history, past surgical history and problem list.    Objective     Physical Exam:  Vital Signs:   Vitals:    09/19/22 1601   BP: 93/58   Pulse: 72   Resp: 16   Temp: 97.8 °F (36.6 °C)   TempSrc: Infrared   Weight: 62.1 kg (137 lb)   Height: 167.6 cm (66\")       Physical Exam  Constitutional:       Appearance: Normal appearance.   HENT:      Head: Normocephalic and atraumatic.   Eyes:      Conjunctiva/sclera: Conjunctivae normal.   Abdominal:      General: Abdomen is flat. There is no distension.      Palpations: There is no mass.    "   Tenderness: There is no abdominal tenderness. There is no guarding or rebound.      Hernia: No hernia is present.   Musculoskeletal:      Cervical back: Normal range of motion and neck supple.   Neurological:      Mental Status: She is alert.         Results Review:   I reviewed the patient's new clinical results.    Office Visit on 09/12/2022   Component Date Value Ref Range Status   • Glucose 09/12/2022 88  65 - 99 mg/dL Final   • BUN 09/12/2022 10  6 - 20 mg/dL Final   • Creatinine 09/12/2022 0.71  0.57 - 1.00 mg/dL Final   • EGFR Result 09/12/2022 98.7  >60.0 mL/min/1.73 Final    Comment: National Kidney Foundation and American Society of  Nephrology (ASN) Task Force recommended calculation based  on the Chronic Kidney Disease Epidemiology Collaboration  (CKD-EPI) equation refit without adjustment for race.  GFR Normal >60  Chronic Kidney Disease <60  Kidney Failure <15     • BUN/Creatinine Ratio 09/12/2022 14.1  7.0 - 25.0 Final   • Sodium 09/12/2022 143  136 - 145 mmol/L Final   • Potassium 09/12/2022 4.5  3.5 - 5.2 mmol/L Final   • Chloride 09/12/2022 103  98 - 107 mmol/L Final   • Total CO2 09/12/2022 29.6 (A) 22.0 - 29.0 mmol/L Final   • Calcium 09/12/2022 9.7  8.6 - 10.5 mg/dL Final   • Total Protein 09/12/2022 7.3  6.0 - 8.5 g/dL Final   • Albumin 09/12/2022 4.70  3.50 - 5.20 g/dL Final   • Globulin 09/12/2022 2.6  gm/dL Final   • A/G Ratio 09/12/2022 1.8  g/dL Final   • Total Bilirubin 09/12/2022 0.4  0.0 - 1.2 mg/dL Final   • Alkaline Phosphatase 09/12/2022 80  39 - 117 U/L Final   • AST (SGOT) 09/12/2022 17  1 - 32 U/L Final   • ALT (SGPT) 09/12/2022 11  1 - 33 U/L Final   • LDL Cholesterol  09/12/2022 124 (A) 0 - 99 mg/dL Final   • WBC 09/12/2022 4.02  3.40 - 10.80 10*3/mm3 Final   • RBC 09/12/2022 4.85  3.77 - 5.28 10*6/mm3 Final   • Hemoglobin 09/12/2022 14.5  12.0 - 15.9 g/dL Final   • Hematocrit 09/12/2022 44.0  34.0 - 46.6 % Final   • MCV 09/12/2022 90.7  79.0 - 97.0 fL Final   • MCH 09/12/2022  29.9  26.6 - 33.0 pg Final   • MCHC 09/12/2022 33.0  31.5 - 35.7 g/dL Final   • RDW 09/12/2022 12.3  12.3 - 15.4 % Final   • Platelets 09/12/2022 139 (A) 140 - 450 10*3/mm3 Final      No radiology results for the last 90 days.    Dated August 15, 2016 the patient underwent a colonoscopy by Dr. Melendez which revealed: Large polyp at 30 cm.  This has a globular component with central dimpling.  It also seems to be growing like a  carpet out over the mucosa as well.  The bulk of this was removed with hot snare polypectomy.  Dina ink was used to marianne the area.  Otherwise, the patient had a normal colon to the cecum with the exception of a long and redundant colon.  Internal hemorrhoids, grade 1.  Sphincter tone, normal.     Dated December 22, 2017 the patient underwent a colonoscopy to the terminal ileum which revealed: Left colonic anastomosis.  Scant erosion at the anastomotic site.  Biopsies of the anastomotic site.  Internal hemorrhoids.  No endoscopic evidence of colitis was seen.  Random biopsies were obtained from the colon upon withdrawal of the scope.  Scant vascular ectasia.  The colon was noted to be dilated and redundant.  Random colon biopsies revealed compatible with sessile serrated adenoma.  Benign lymphoid aggregates.  Colon, anastomotic site, biopsies revealed benign colonic mucosa with no diagnostic abdomen abnormalities     1/6/2022  - One 6 mm polyp in the mid transverse colon, removed with a cold snare. Resected and retrieved.  - Patent end-to-side colo-colonic anastomosis, characterized by healthy appearing mucosa.  - The examined portion of the ileum was normal. Biopsied for chronic diarrhea.  - Biopsies were taken with a cold forceps from the right colon, left colon and transverse colon for evaluation of  microscopic colitis.    Assessment / Plan      1.  Acquired thrombocytopenia  Lab work done on a 9/12/2022 reviewed and again revealed thrombocytopenia with a platelet count of  139,000.  Ultrasound abdomen done on 3/7/2022 did not reveal any liver lesions.  Liver enzymes normal.  Etiology of her chronic thrombocytopenia is unclear.  Prior work-up does not indicate any signs of cirrhosis.  Patient is nonalcoholic.No medications that could cause low platelet count.    Will refer to hematology for opinion    2.    History of gallbladder polyp.  Her ultrasound gallbladder done in 2017 revealed a 4 mm gallbladder polyp.  With this prior findings she had a repeat ultrasound done in March 2022 which did not reveal any gallbladder pathology.  Patient likely had a small stone that must have passed down.  We will simply monitor for now    3. Diarrhea, unspecified type  4.  Suspected irritable bowel syndrome with diarrhea predominance   9/19/2022  After changing her diet her symptoms significantly improved.  Has been having 1 or 2 soft bowel movement now without any significant abdominal pain.  She did not get her stool elastase and calprotectin done we will reorder today follow-up on that.  We will continue Imodium half tablets p.o. twice daily as needed.  Recent CBC CMP unremarkable except borderline low platelet count.    11/15/2021  Patient's history is very suggestive of IBS mixed type with diarrhea predominance.  Her symptoms have significantly improved after she followed the FODMAP diet and started on a probiotic.  Her celiac serology was negative in 2018. I have advised Imodium half tablets 1 tablet as needed for bowel movement anywhere more than 2 to 3/day       Prior history   5.  Adenomatous colon polyp  3/7/2022  She had a colonoscopy done on 1/6/2022 with a small 6 mm polyp removed from the transverse colon.  Specimen appears to be crusting the suction.  No good specimen formed for pathological evaluation.  She had a large advanced polyp removed in 2016 with a surgical excision.  Repeat colonoscopy in 5 years time in 2027     11/15/2021  This patient had a advanced polyp partially  removed endoscopically in 2016 followed by surgical resection.  Her last colonoscopy was in 2017 and had a 1 sessile serrated adenoma excised.  She is due for her surveillance colonoscopy next year in 2022.  We will schedule the colonoscopy for early next year              Follow Up:   No follow-ups on file.    Lidia Barker MD  [facility] [place]  9/19/2022  16:03 EDT     Please note that portions of this note may n completed with a voice recognition program.

## 2022-10-13 ENCOUNTER — LAB (OUTPATIENT)
Dept: LAB | Facility: HOSPITAL | Age: 57
End: 2022-10-13

## 2022-10-13 ENCOUNTER — CONSULT (OUTPATIENT)
Dept: ONCOLOGY | Facility: CLINIC | Age: 57
End: 2022-10-13

## 2022-10-13 VITALS
SYSTOLIC BLOOD PRESSURE: 115 MMHG | WEIGHT: 139 LBS | TEMPERATURE: 97.7 F | OXYGEN SATURATION: 99 % | HEIGHT: 66 IN | HEART RATE: 72 BPM | DIASTOLIC BLOOD PRESSURE: 57 MMHG | BODY MASS INDEX: 22.34 KG/M2 | RESPIRATION RATE: 16 BRPM

## 2022-10-13 DIAGNOSIS — R23.3 EASY BRUISING: ICD-10-CM

## 2022-10-13 DIAGNOSIS — D69.6 THROMBOCYTOPENIA: ICD-10-CM

## 2022-10-13 DIAGNOSIS — D69.6 THROMBOCYTOPENIA: Primary | ICD-10-CM

## 2022-10-13 LAB
APTT PPP: 26.2 SECONDS (ref 23.5–35.5)
BASOPHILS # BLD AUTO: 0.03 10*3/MM3 (ref 0–0.2)
BASOPHILS NFR BLD AUTO: 0.6 % (ref 0–1.5)
DEPRECATED RDW RBC AUTO: 42.9 FL (ref 37–54)
EOSINOPHIL # BLD AUTO: 0.02 10*3/MM3 (ref 0–0.4)
EOSINOPHIL NFR BLD AUTO: 0.4 % (ref 0.3–6.2)
ERYTHROCYTE [DISTWIDTH] IN BLOOD BY AUTOMATED COUNT: 13.1 % (ref 12.3–15.4)
HCT VFR BLD AUTO: 40.5 % (ref 34–46.6)
HGB BLD-MCNC: 13.6 G/DL (ref 12–15.9)
IMM GRANULOCYTES # BLD AUTO: 0.04 10*3/MM3 (ref 0–0.05)
IMM GRANULOCYTES NFR BLD AUTO: 0.8 % (ref 0–0.5)
INR PPP: 0.96 (ref 0.9–1.1)
LYMPHOCYTES # BLD AUTO: 1.18 10*3/MM3 (ref 0.7–3.1)
LYMPHOCYTES NFR BLD AUTO: 25 % (ref 19.6–45.3)
MCH RBC QN AUTO: 30.3 PG (ref 26.6–33)
MCHC RBC AUTO-ENTMCNC: 33.6 G/DL (ref 31.5–35.7)
MCV RBC AUTO: 90.2 FL (ref 79–97)
MONOCYTES # BLD AUTO: 0.26 10*3/MM3 (ref 0.1–0.9)
MONOCYTES NFR BLD AUTO: 5.5 % (ref 5–12)
NEUTROPHILS NFR BLD AUTO: 3.19 10*3/MM3 (ref 1.7–7)
NEUTROPHILS NFR BLD AUTO: 67.7 % (ref 42.7–76)
NRBC BLD AUTO-RTO: 0 /100 WBC (ref 0–0.2)
PLATELET # BLD AUTO: 112 10*3/MM3 (ref 140–450)
PMV BLD AUTO: 10.4 FL (ref 6–12)
PROTHROMBIN TIME: 13 SECONDS (ref 12.5–14.5)
RBC # BLD AUTO: 4.49 10*6/MM3 (ref 3.77–5.28)
WBC NRBC COR # BLD: 4.72 10*3/MM3 (ref 3.4–10.8)

## 2022-10-13 PROCEDURE — 82607 VITAMIN B-12: CPT

## 2022-10-13 PROCEDURE — 82746 ASSAY OF FOLIC ACID SERUM: CPT

## 2022-10-13 PROCEDURE — 84466 ASSAY OF TRANSFERRIN: CPT

## 2022-10-13 PROCEDURE — 99204 OFFICE O/P NEW MOD 45 MIN: CPT | Performed by: INTERNAL MEDICINE

## 2022-10-13 PROCEDURE — 82728 ASSAY OF FERRITIN: CPT

## 2022-10-13 PROCEDURE — 83010 ASSAY OF HAPTOGLOBIN QUANT: CPT

## 2022-10-13 PROCEDURE — 36415 COLL VENOUS BLD VENIPUNCTURE: CPT

## 2022-10-13 PROCEDURE — 83540 ASSAY OF IRON: CPT

## 2022-10-13 PROCEDURE — 85730 THROMBOPLASTIN TIME PARTIAL: CPT

## 2022-10-13 PROCEDURE — 83615 LACTATE (LD) (LDH) ENZYME: CPT

## 2022-10-13 PROCEDURE — 85060 BLOOD SMEAR INTERPRETATION: CPT

## 2022-10-13 PROCEDURE — 85610 PROTHROMBIN TIME: CPT

## 2022-10-13 PROCEDURE — 80050 GENERAL HEALTH PANEL: CPT
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
## 2022-10-14 LAB
ALBUMIN SERPL-MCNC: 4.2 G/DL (ref 3.5–5.2)
ALBUMIN/GLOB SERPL: 1.5 G/DL
ALP SERPL-CCNC: 69 U/L (ref 39–117)
ALT SERPL W P-5'-P-CCNC: 14 U/L (ref 1–33)
ANION GAP SERPL CALCULATED.3IONS-SCNC: 7.5 MMOL/L (ref 5–15)
AST SERPL-CCNC: 17 U/L (ref 1–32)
BILIRUB SERPL-MCNC: 0.3 MG/DL (ref 0–1.2)
BUN SERPL-MCNC: 11 MG/DL (ref 6–20)
BUN/CREAT SERPL: 11.2 (ref 7–25)
CALCIUM SPEC-SCNC: 9.3 MG/DL (ref 8.6–10.5)
CHLORIDE SERPL-SCNC: 107 MMOL/L (ref 98–107)
CO2 SERPL-SCNC: 27.5 MMOL/L (ref 22–29)
CREAT SERPL-MCNC: 0.98 MG/DL (ref 0.57–1)
EGFRCR SERPLBLD CKD-EPI 2021: 67 ML/MIN/1.73
FERRITIN SERPL-MCNC: 101 NG/ML (ref 13–150)
FOLATE SERPL-MCNC: 17.5 NG/ML (ref 4.78–24.2)
GLOBULIN UR ELPH-MCNC: 2.8 GM/DL
GLUCOSE SERPL-MCNC: 101 MG/DL (ref 65–99)
HAPTOGLOB SERPL-MCNC: 108 MG/DL (ref 30–200)
IRON 24H UR-MRATE: 88 MCG/DL (ref 37–145)
IRON SATN MFR SERPL: 23 % (ref 20–50)
LDH SERPL-CCNC: 188 U/L (ref 135–214)
POTASSIUM SERPL-SCNC: 3.9 MMOL/L (ref 3.5–5.2)
PROT SERPL-MCNC: 7 G/DL (ref 6–8.5)
SODIUM SERPL-SCNC: 142 MMOL/L (ref 136–145)
TIBC SERPL-MCNC: 387 MCG/DL (ref 298–536)
TRANSFERRIN SERPL-MCNC: 260 MG/DL (ref 200–360)
TSH SERPL DL<=0.05 MIU/L-ACNC: 0.99 UIU/ML (ref 0.27–4.2)
VIT B12 BLD-MCNC: 831 PG/ML (ref 211–946)

## 2022-10-28 ENCOUNTER — TELEPHONE (OUTPATIENT)
Dept: ONCOLOGY | Facility: CLINIC | Age: 57
End: 2022-10-28

## 2022-10-28 NOTE — TELEPHONE ENCOUNTER
Caller: Jeni Newby    Relationship: Self    Best call back number: [phone]    What is the best time to reach you: ANYTIME    Who are you requesting to speak with (clinical staff, provider,  specific staff member): SCHEDULING    What was the call regarding: PLEASE CALL PT TO R/S HER 11/3 APPT.     Do you require a callback: YES

## 2022-10-31 LAB
CYTOLOGIST CVX/VAG CYTO: NORMAL
PATH INTERP BLD-IMP: NORMAL

## 2022-11-17 ENCOUNTER — OFFICE VISIT (OUTPATIENT)
Dept: ONCOLOGY | Facility: CLINIC | Age: 57
End: 2022-11-17

## 2022-11-17 VITALS
HEIGHT: 66 IN | WEIGHT: 136 LBS | OXYGEN SATURATION: 98 % | BODY MASS INDEX: 21.86 KG/M2 | RESPIRATION RATE: 16 BRPM | HEART RATE: 63 BPM | SYSTOLIC BLOOD PRESSURE: 110 MMHG | TEMPERATURE: 98.4 F | DIASTOLIC BLOOD PRESSURE: 63 MMHG

## 2022-11-17 DIAGNOSIS — D69.6 THROMBOCYTOPENIA: Primary | ICD-10-CM

## 2022-11-17 DIAGNOSIS — R23.3 EASY BRUISING: ICD-10-CM

## 2022-11-17 PROCEDURE — 99214 OFFICE O/P EST MOD 30 MIN: CPT | Performed by: INTERNAL MEDICINE
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
## 2022-12-02 ENCOUNTER — TRANSCRIBE ORDERS (OUTPATIENT)
Dept: OBSTETRICS AND GYNECOLOGY | Facility: CLINIC | Age: 57
End: 2022-12-02

## 2022-12-02 DIAGNOSIS — Z12.31 ENCOUNTER FOR SCREENING MAMMOGRAM FOR BREAST CANCER: Primary | ICD-10-CM

## 2023-01-03 ENCOUNTER — TELEPHONE (OUTPATIENT)
Dept: OBSTETRICS AND GYNECOLOGY | Facility: CLINIC | Age: 58
End: 2023-01-03

## 2023-01-03 NOTE — TELEPHONE ENCOUNTER
Caller: Jeni Newby    Relationship to patient: Self    Best call back number: [phone]    Patient is needing: PT CANCELLED SAME DAY APPT WITH DR. WHARTON FOR 4. RESCHEDULED FOR 2/24/23

## 2023-01-23 ENCOUNTER — APPOINTMENT (OUTPATIENT)
Dept: MAMMOGRAPHY | Facility: HOSPITAL | Age: 58
End: 2023-01-23
Payer: COMMERCIAL

## 2023-02-14 ENCOUNTER — HOSPITAL ENCOUNTER (OUTPATIENT)
Dept: MAMMOGRAPHY | Facility: HOSPITAL | Age: 58
Discharge: HOME | End: 2023-02-14
Admitting: OBSTETRICS & GYNECOLOGY
Payer: COMMERCIAL

## 2023-02-14 DIAGNOSIS — Z12.31 ENCOUNTER FOR SCREENING MAMMOGRAM FOR BREAST CANCER: ICD-10-CM

## 2023-02-14 PROCEDURE — 77063 BREAST TOMOSYNTHESIS BI: CPT

## 2023-02-14 PROCEDURE — 77063 BREAST TOMOSYNTHESIS BI: CPT | Performed by: RADIOLOGY

## 2023-02-14 PROCEDURE — 77067 SCR MAMMO BI INCL CAD: CPT

## 2023-02-14 PROCEDURE — 77067 SCR MAMMO BI INCL CAD: CPT | Performed by: RADIOLOGY

## 2023-02-24 ENCOUNTER — OFFICE VISIT (OUTPATIENT)
Dept: OBSTETRICS AND GYNECOLOGY | Facility: CLINIC | Age: 58
End: 2023-02-24
Payer: COMMERCIAL

## 2023-02-24 VITALS — DIASTOLIC BLOOD PRESSURE: 68 MMHG | SYSTOLIC BLOOD PRESSURE: 108 MMHG | WEIGHT: 140.4 LBS | BODY MASS INDEX: 22.66 KG/M2

## 2023-02-24 DIAGNOSIS — Z01.419 PAP TEST, AS PART OF ROUTINE GYNECOLOGICAL EXAMINATION: Primary | ICD-10-CM

## 2023-02-24 DIAGNOSIS — B02.9 HERPES ZOSTER WITHOUT COMPLICATION: ICD-10-CM

## 2023-02-24 PROCEDURE — 99396 PREV VISIT EST AGE 40-64: CPT | Performed by: OBSTETRICS & GYNECOLOGY
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
## 2023-02-27 LAB — REF LAB TEST METHOD: NORMAL

## 2023-05-04 NOTE — PROGRESS NOTES
November 19, 2021    Hello, may I speak with Jeni Newby?    My name is Erica    I am  with [facility]  [address]  [facility] [place][note 2] [address] [phone].    Before we get started may I verify your date of birth? 1965    I am calling to officially welcome you to our practice and ask about your recent visit. Is this a good time to talk? yes    Tell me about your visit with us. What things went well?  went good, had to wait just a little       We're always looking for ways to make our patients' experiences even better. Do you have recommendations on ways we may improve?  no    Overall were you satisfied with your first visit to our practice? yes     I appreciate you taking the time to speak with me today. Is there anything else I can do for you? No, but I did want to share that the RX prep was covered with my insurance and I will bring the coupon back in.      Thank you, and have a great day.       (3) adequate

## 2023-06-22 PROBLEM — H69.91 DYSFUNCTION OF RIGHT EUSTACHIAN TUBE: Status: ACTIVE | Noted: 2023-06-22

## 2023-06-22 PROBLEM — H69.81 DYSFUNCTION OF RIGHT EUSTACHIAN TUBE: Status: ACTIVE | Noted: 2023-06-22

## 2023-09-14 ENCOUNTER — OFFICE VISIT (OUTPATIENT)
Dept: INTERNAL MEDICINE | Facility: CLINIC | Age: 58
End: 2023-09-14
Payer: COMMERCIAL

## 2023-09-14 VITALS
WEIGHT: 137.8 LBS | DIASTOLIC BLOOD PRESSURE: 64 MMHG | HEIGHT: 66 IN | SYSTOLIC BLOOD PRESSURE: 94 MMHG | HEART RATE: 79 BPM | TEMPERATURE: 98 F | OXYGEN SATURATION: 98 % | BODY MASS INDEX: 22.14 KG/M2

## 2023-09-14 DIAGNOSIS — R19.7 DIARRHEA OF PRESUMED INFECTIOUS ORIGIN: ICD-10-CM

## 2023-09-14 DIAGNOSIS — Z00.00 ROUTINE GENERAL MEDICAL EXAMINATION AT A HEALTH CARE FACILITY: Primary | ICD-10-CM

## 2023-09-14 DIAGNOSIS — H90.3 SENSORINEURAL HEARING LOSS (SNHL) OF BOTH EARS: ICD-10-CM

## 2023-09-14 PROCEDURE — 99396 PREV VISIT EST AGE 40-64: CPT | Performed by: INTERNAL MEDICINE
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
## 2023-09-15 LAB
ALBUMIN SERPL-MCNC: 4.3 G/DL (ref 3.8–4.9)
ALBUMIN/GLOB SERPL: 1.7 {RATIO} (ref 1.2–2.2)
ALP SERPL-CCNC: 77 IU/L (ref 44–121)
ALT SERPL-CCNC: 11 IU/L (ref 0–32)
AST SERPL-CCNC: 19 IU/L (ref 0–40)
BILIRUB SERPL-MCNC: 0.3 MG/DL (ref 0–1.2)
BUN SERPL-MCNC: 13 MG/DL (ref 6–24)
BUN/CREAT SERPL: 15 (ref 9–23)
CALCIUM SERPL-MCNC: 9.2 MG/DL (ref 8.7–10.2)
CHLORIDE SERPL-SCNC: 103 MMOL/L (ref 96–106)
CO2 SERPL-SCNC: 24 MMOL/L (ref 20–29)
CREAT SERPL-MCNC: 0.87 MG/DL (ref 0.57–1)
EGFRCR SERPLBLD CKD-EPI 2021: 77 ML/MIN/1.73
ERYTHROCYTE [DISTWIDTH] IN BLOOD BY AUTOMATED COUNT: 12.6 % (ref 11.7–15.4)
GLOBULIN SER CALC-MCNC: 2.6 G/DL (ref 1.5–4.5)
GLUCOSE SERPL-MCNC: 93 MG/DL (ref 70–99)
HCT VFR BLD AUTO: 40.4 % (ref 34–46.6)
HGB BLD-MCNC: 13.7 G/DL (ref 11.1–15.9)
LDLC SERPL DIRECT ASSAY-MCNC: 112 MG/DL (ref 0–99)
MCH RBC QN AUTO: 30.2 PG (ref 26.6–33)
MCHC RBC AUTO-ENTMCNC: 33.9 G/DL (ref 31.5–35.7)
MCV RBC AUTO: 89 FL (ref 79–97)
PLATELET # BLD AUTO: 118 X10E3/UL (ref 150–450)
POTASSIUM SERPL-SCNC: 4.1 MMOL/L (ref 3.5–5.2)
PROT SERPL-MCNC: 6.9 G/DL (ref 6–8.5)
RBC # BLD AUTO: 4.54 X10E6/UL (ref 3.77–5.28)
SODIUM SERPL-SCNC: 142 MMOL/L (ref 134–144)
TSH SERPL DL<=0.005 MIU/L-ACNC: 0.93 UIU/ML (ref 0.45–4.5)
WBC # BLD AUTO: 5.7 X10E3/UL (ref 3.4–10.8)

## 2023-09-16 ENCOUNTER — LAB REQUISITION (OUTPATIENT)
Dept: LAB | Facility: HOSPITAL | Age: 58
End: 2023-09-16
Payer: COMMERCIAL

## 2023-09-16 DIAGNOSIS — R19.7 DIARRHEA, UNSPECIFIED: ICD-10-CM

## 2023-09-16 PROCEDURE — 83993 ASSAY FOR CALPROTECTIN FECAL: CPT | Performed by: INTERNAL MEDICINE

## 2023-09-16 PROCEDURE — 82653 EL-1 FECAL QUANTITATIVE: CPT | Performed by: INTERNAL MEDICINE

## 2023-09-19 ENCOUNTER — OFFICE VISIT (OUTPATIENT)
Dept: GASTROENTEROLOGY | Facility: CLINIC | Age: 58
End: 2023-09-19
Payer: COMMERCIAL

## 2023-09-19 VITALS
TEMPERATURE: 98.4 F | SYSTOLIC BLOOD PRESSURE: 92 MMHG | HEART RATE: 76 BPM | HEIGHT: 66 IN | BODY MASS INDEX: 21.86 KG/M2 | DIASTOLIC BLOOD PRESSURE: 65 MMHG | WEIGHT: 136 LBS

## 2023-09-19 DIAGNOSIS — K58.2 IRRITABLE BOWEL SYNDROME WITH BOTH CONSTIPATION AND DIARRHEA: ICD-10-CM

## 2023-09-19 DIAGNOSIS — R10.31 RIGHT LOWER QUADRANT ABDOMINAL PAIN: ICD-10-CM

## 2023-09-19 DIAGNOSIS — D69.6 ACQUIRED THROMBOCYTOPENIA: ICD-10-CM

## 2023-09-19 DIAGNOSIS — R11.0 NAUSEA: Primary | ICD-10-CM

## 2023-09-19 LAB — CALPROTECTIN STL-MCNT: 14 UG/G (ref 0–120)

## 2023-09-19 PROCEDURE — 99214 OFFICE O/P EST MOD 30 MIN: CPT | Performed by: INTERNAL MEDICINE

## 2023-09-19 RX ORDER — ONDANSETRON 4 MG/1
4 TABLET, FILM COATED ORAL EVERY 8 HOURS PRN
Qty: 30 TABLET | Refills: 1 | Status: SHIPPED | OUTPATIENT
Start: 2023-09-19

## 2023-09-19 NOTE — PROGRESS NOTES
"     Follow Up Note     Date: 2023   Patient Name: Jeni Newby  MRN: [id number]  : 1965     Referring Physician: Philipp Boogie MD    Chief Complaint:    Chief Complaint   Patient presents with    Diarrhea    GB polyp    thrombocytopenia       Interval History:   2023  Jeni Newby is a 58 y.o. female who is here today for follow up for her IBS symptoms.  He states that she is doing pretty well except occasional episodes of loose stools once in a month or so.  Mostly will have a soft bowel movement once in 2 days or so.  No significant abdominal pain except occasional right lower quadrant discomfort.  She was seen by hematology for her low platelet count.    2018  The patient came back for follow visit today. Currently the patient has been having irregular bowel movements. She is been having alternating constipation and diarrhea. The patient has a history of constipation off and on for the last 3 months . Constipation is episodic. It may last for 4-5 days.Severity is mild. This is described as firm stools perhaps one bowel movement every other day. The patient is not taking laxatives. There is no associated rectal pain.episodes of constipation are followed by episodes of diarrhea. During the diarrheal episodes the patient may have 5-6 watery bowel movements a day. The diarrhea may last for a couple of days. The patient has been complaining of gas and bloated feeling as well. There is no history of overt GI bleed (Hematemesis, melena or hematochezia). She denies nausea or vomiting. There is no fever or chills. She denies recent weight loss. Her abdominal pain has significantly   Subjective      Past Medical History:   Past Medical History:   Diagnosis Date    Allergic     Asthma     ALLERGY INDUCED     Body piercing     EARS    Cancer 2017    basal cell carcinoma, NOSE     Colonic polyp     REPORTS HAD A \"CARPET GROWTH THAT REQUIRED A PORTION OF HER COLON BE REMOVED\"    History " of bronchitis     History of migraine     IBS (irritable bowel syndrome)     Pneumonia     Seasonal allergies     Wears eyeglasses      Past Surgical History:   Past Surgical History:   Procedure Laterality Date    COLON RESECTION N/A 09/07/2016    Procedure: LOW ANTERIOR COLON RESECTION LAPAROSCOPIC  WITH DAVINCI SI ROBOT VS EXTENDED LEFT COLECTOMY WITH TAP BLOCK;  Surgeon: Isra Melendez MD;  Location:  [facility];  Service:     COLONOSCOPY      2016    COLONOSCOPY N/A 12/22/2017    Procedure: COLONOSCOPY WITH BIOPSIES;  Surgeon: Tino Stapleton MD;  Location:  [facility];  Service:     COLONOSCOPY N/A 01/06/2022    Procedure: COLONOSCOPY with biopsies and polypectomy x1 ;  Surgeon: Lidia Barker MD;  Location:  [facility];  Service: Gastroenterology;  Laterality: N/A;    PROCTOSCOPY N/A 09/07/2016    Procedure: PROCTOSCOPY RIGID;  Surgeon: Isra Melendez MD;  Location:  [facility];  Service:     SKIN CANCER EXCISION      WISDOM TOOTH EXTRACTION         Family History:   Family History   Problem Relation Age of Onset    Heart disease Mother     Ovarian cancer Maternal Grandmother 64    Breast cancer Maternal Aunt 86    Colon cancer Neg Hx        Social History:   Social History     Socioeconomic History    Marital status: Single   Tobacco Use    Smoking status: Never    Smokeless tobacco: Never   Vaping Use    Vaping Use: Never used   Substance and Sexual Activity    Alcohol use: No    Drug use: No    Sexual activity: Yes     Partners: Male     Birth control/protection: None       Medications:     Current Outpatient Medications:     Auvi-Q 0.3 MG/0.3ML solution auto-injector injection, INJECT AS NEEDED FOR SEVERE ALLERGIC REACTION INCLUDING ANAPHYLAXIS AS DIRECTED, Disp: , Rfl:     azelastine (ASTELIN) 0.1 % nasal spray, 2 sprays into the nostril(s) as directed by provider 2 (Two) Times a Day. Use in each nostril as directed, Disp: 30 mL, Rfl: 0    budesonide-formoterol (SYMBICORT) 80-4.5 MCG/ACT  inhaler, Inhale 2 puffs 2 (Two) Times a Day. Symbicort, Disp: , Rfl:     calcium carbonate, oyster shell, 500 MG tablet tablet, Take 1 tablet by mouth 2 (Two) Times a Day., Disp: , Rfl:     Epsolay 5 % cream, , Disp: , Rfl:     Flovent  MCG/ACT inhaler, Inhale 2 puffs 2 (Two) Times a Day., Disp: , Rfl:     Glucosamine-Chondroit-Vit C-Mn (GLUCOSAMINE 1500 COMPLEX PO), Take 1 tablet by mouth Daily., Disp: , Rfl:     ketoconazole (NIZORAL) 2 % shampoo, SHAMPOO AND LATHER TO SCALP AND BEHIND EARS FOR 5-10 MINUTES 2-4 TIMES A WEEK AS NEEDED, Disp: , Rfl:     Multiple Vitamins-Minerals (MULTIVITAMIN ADULT PO), Take 1 tablet by mouth Daily., Disp: , Rfl:     Probiotic Product (PROBIOTIC-10 PO), , Disp: , Rfl:     Soolantra 1 % cream, , Disp: , Rfl:     triamcinolone (KENALOG) 0.1 % ointment, Apply 1 application topically to the appropriate area as directed 2 (Two) Times a Day., Disp: 15 g, Rfl: 0    Allergies:   Allergies   Allergen Reactions    Mushroom Extract Complex Swelling     Throat swelling    Corn-Containing Products GI Intolerance     Cramps and diarrhea       Review of Systems:   Review of Systems   Constitutional:  Negative for appetite change, fatigue, fever and unexpected weight loss.   HENT:  Negative for trouble swallowing.    Gastrointestinal:  Positive for abdominal pain, constipation (rare), diarrhea (as of recently) and indigestion (rare). Negative for abdominal distention, anal bleeding, blood in stool, nausea, rectal pain, vomiting and GERD.        Gassy      Hematological:  Bruises/bleeds easily.     The following portions of the patient's history were reviewed and updated as appropriate: allergies, current medications, past family history, past medical history, past social history, past surgical history and problem list.    Objective     Physical Exam:  Vital Signs: There were no vitals filed for this visit.    Physical Exam  Constitutional:       Appearance: Normal appearance.   HENT:       Head: Normocephalic and atraumatic.   Eyes:      Conjunctiva/sclera: Conjunctivae normal.   Abdominal:      General: Abdomen is flat. There is no distension.      Palpations: There is no mass.      Tenderness: There is no abdominal tenderness. There is no guarding or rebound.      Hernia: No hernia is present.   Musculoskeletal:      Cervical back: Normal range of motion and neck supple.   Neurological:      Mental Status: She is alert.       Results Review:   I reviewed the patient's new clinical results.    Office Visit on 09/14/2023   Component Date Value Ref Range Status    LDL Cholesterol  09/14/2023 112 (H)  0 - 99 mg/dL Final    Glucose 09/14/2023 93  70 - 99 mg/dL Final    BUN 09/14/2023 13  6 - 24 mg/dL Final    Creatinine 09/14/2023 0.87  0.57 - 1.00 mg/dL Final    EGFR Result 09/14/2023 77  >59 mL/min/1.73 Final    BUN/Creatinine Ratio 09/14/2023 15  9 - 23 Final    Sodium 09/14/2023 142  134 - 144 mmol/L Final    Potassium 09/14/2023 4.1  3.5 - 5.2 mmol/L Final    Chloride 09/14/2023 103  96 - 106 mmol/L Final    Total CO2 09/14/2023 24  20 - 29 mmol/L Final    Calcium 09/14/2023 9.2  8.7 - 10.2 mg/dL Final    Total Protein 09/14/2023 6.9  6.0 - 8.5 g/dL Final    Albumin 09/14/2023 4.3  3.8 - 4.9 g/dL Final    Globulin 09/14/2023 2.6  1.5 - 4.5 g/dL Final    A/G Ratio 09/14/2023 1.7  1.2 - 2.2 Final    Total Bilirubin 09/14/2023 0.3  0.0 - 1.2 mg/dL Final    Alkaline Phosphatase 09/14/2023 77  44 - 121 IU/L Final    AST (SGOT) 09/14/2023 19  0 - 40 IU/L Final    ALT (SGPT) 09/14/2023 11  0 - 32 IU/L Final    WBC 09/14/2023 5.7  3.4 - 10.8 x10E3/uL Final    **Verified by repeat analysis**    RBC 09/14/2023 4.54  3.77 - 5.28 x10E6/uL Final    Hemoglobin 09/14/2023 13.7  11.1 - 15.9 g/dL Final    Hematocrit 09/14/2023 40.4  34.0 - 46.6 % Final    MCV 09/14/2023 89  79 - 97 fL Final    MCH 09/14/2023 30.2  26.6 - 33.0 pg Final    MCHC 09/14/2023 33.9  31.5 - 35.7 g/dL Final    RDW 09/14/2023 12.6  11.7 -  15.4 % Final    Platelets 09/14/2023 118 (L)  150 - 450 x10E3/uL Final    TSH 09/14/2023 0.929  0.450 - 4.500 uIU/mL Final      No radiology results for the last 90 days.    Dated August 15, 2016 the patient underwent a colonoscopy by Dr. Melendez which revealed: Large polyp at 30 cm.  This has a globular component with central dimpling.  It also seems to be growing like a  carpet out over the mucosa as well.  The bulk of this was removed with hot snare polypectomy.  Dina ink was used to marianne the area.  Otherwise, the patient had a normal colon to the cecum with the exception of a long and redundant colon.  Internal hemorrhoids, grade 1.  Sphincter tone, normal.     Dated December 22, 2017 the patient underwent a colonoscopy to the terminal ileum which revealed: Left colonic anastomosis.  Scant erosion at the anastomotic site.  Biopsies of the anastomotic site.  Internal hemorrhoids.  No endoscopic evidence of colitis was seen.  Random biopsies were obtained from the colon upon withdrawal of the scope.  Scant vascular ectasia.  The colon was noted to be dilated and redundant.  Random colon biopsies revealed compatible with sessile serrated adenoma.  Benign lymphoid aggregates.  Colon, anastomotic site, biopsies revealed benign colonic mucosa with no diagnostic abdomen abnormalities     Colonoscopy on 1/6/2022  - One 6 mm polyp in the mid transverse colon, removed with a cold snare. Resected and retrieved.  - Patent end-to-side colo-colonic anastomosis, characterized by healthy appearing mucosa.  - The examined portion of the ileum was normal. Biopsied for chronic diarrhea.  - Biopsies were taken with a cold forceps from the right colon, left colon and transverse colon for evaluation of microscopic colitis.    Assessment / Plan      1.  Change in bowel habit  2.  Suspected irritable bowel syndrome with diarrhea predominance   3.  Intermittent right lower quadrant abdominal pain   9/19/2023  Patient is clinically doing  much better after changing her diet.  Once in 3 weeks she will have episodes of loose stools otherwise she has been having good bowel movement once in 2 days or so without any major issues.  Still has occasional right lower quadrant abdominal pain , fecal urgency and intermittent nausea.  Lab work done on 9/14/2023 reviewed and reveals normal CMP normal CBC.  Hemoglobin was 13.7 platelets still low at 118,000.  She just gave her stool samples and report pending    Continue current care  We will follow stool fecal elastase and calprotectin  Probiotic to continue  Zofran as needed for nausea  Low-dose Imodium as needed for episodes of loose stools  No significant abdominal pain that required any medication currently    11/15/2021  Patient's history is very suggestive of IBS mixed type with diarrhea predominance.  Her symptoms have significantly improved after she followed the FODMAP diet and started on a probiotic.  Her celiac serology was negative in 2018. I have advised Imodium half tablets 1 tablet as needed for bowel movement anywhere more than 2 to 3/day    4.  Acquired thrombocytopenia  9/19/2023  Patient still has borderline thrombocytopenia with a easy bruising.  Patient is already seen by hematology and no specific diagnosis was made.  We will continue to monitor    3/7/2022  Lab work done on a 9/12/2022 reviewed and again revealed thrombocytopenia with a platelet count of 139,000. Ultrasound abdomen done on 3/7/2022 did not reveal any liver lesions.  Liver enzymes normal. Etiology of her chronic thrombocytopenia is unclear.  Prior work-up does not indicate any signs of cirrhosis.  Patient is nonalcoholic.No medications that could cause low platelet count.     Will refer to hematology for opinion     Prior history   5.  Adenomatous colon polyp  3/7/2022  She had a colonoscopy done on 1/6/2022 with a small 6 mm polyp removed from the transverse colon.  Specimen appears to be crusting the suction.  No good  specimen formed for pathological evaluation.  She had a large advanced polyp removed in 2016 with a surgical excision rectosigmoid polyp.  Repeat colonoscopy in 5 years time in 2027    6.  History of gallbladder polyp.  Her ultrasound gallbladder done in 2017 revealed a 4 mm gallbladder polyp.  With this prior findings she had a repeat ultrasound done in March 2022 which did not reveal any gallbladder pathology.  Patient likely had a small stone that must have passed down.  We will simply monitor for now      Follow Up:   No follow-ups on file.    Lidia Barker MD  [facility] [place]  9/19/2023  15:26 EDT     Please note that portions of this note may have been completed with a voice recognition program.

## 2023-09-21 LAB — ELASTASE PANC STL-MCNT: 333 UG ELAST./G

## 2023-10-10 ENCOUNTER — DOCUMENTATION (OUTPATIENT)
Dept: INTERNAL MEDICINE | Facility: CLINIC | Age: 58
End: 2023-10-10
Payer: COMMERCIAL

## 2023-10-10 ENCOUNTER — CLINICAL SUPPORT (OUTPATIENT)
Dept: INTERNAL MEDICINE | Facility: CLINIC | Age: 58
End: 2023-10-10
Payer: COMMERCIAL

## 2023-10-10 ENCOUNTER — SPECIMEN COLLECTION (OUTPATIENT)
Dept: INTERNAL MEDICINE | Facility: CLINIC | Age: 58
End: 2023-10-10
Payer: COMMERCIAL

## 2023-10-10 DIAGNOSIS — B35.1 TOENAIL FUNGUS: Primary | ICD-10-CM

## 2023-10-13 LAB
FUNGUS SPEC CULT: NORMAL
FUNGUS SPEC FUNGUS STN: NORMAL

## 2023-10-25 LAB
FUNGUS SPEC CULT ORG #2: ABNORMAL
FUNGUS SPEC CULT: ABNORMAL
FUNGUS SPEC FUNGUS STN: ABNORMAL

## 2023-11-03 LAB
FUNGUS SPEC CULT ORG #2: ABNORMAL
FUNGUS SPEC CULT: ABNORMAL
FUNGUS SPEC FUNGUS STN: ABNORMAL

## 2023-11-09 ENCOUNTER — FLU SHOT (OUTPATIENT)
Dept: INTERNAL MEDICINE | Facility: CLINIC | Age: 58
End: 2023-11-09
Payer: COMMERCIAL

## 2023-11-09 DIAGNOSIS — Z23 NEEDS FLU SHOT: Primary | ICD-10-CM

## 2023-11-17 LAB
FUNGUS SPEC CULT ORG #2: ABNORMAL
FUNGUS SPEC CULT: ABNORMAL
FUNGUS SPEC FUNGUS STN: ABNORMAL

## 2023-12-29 DIAGNOSIS — M25.561 ACUTE PAIN OF RIGHT KNEE: Primary | ICD-10-CM

## 2024-01-02 DIAGNOSIS — M25.562 ACUTE PAIN OF LEFT KNEE: Primary | ICD-10-CM

## 2024-01-19 RX ORDER — BENZONATATE 100 MG/1
100 CAPSULE ORAL 3 TIMES DAILY PRN
Qty: 30 CAPSULE | Refills: 0 | Status: SHIPPED | OUTPATIENT
Start: 2024-01-19

## 2024-01-22 ENCOUNTER — HOSPITAL ENCOUNTER (OUTPATIENT)
Dept: GENERAL RADIOLOGY | Facility: HOSPITAL | Age: 59
Discharge: HOME | End: 2024-01-22
Admitting: INTERNAL MEDICINE
Payer: COMMERCIAL

## 2024-01-22 ENCOUNTER — OFFICE VISIT (OUTPATIENT)
Dept: INTERNAL MEDICINE | Facility: CLINIC | Age: 59
End: 2024-01-22
Payer: COMMERCIAL

## 2024-01-22 VITALS
SYSTOLIC BLOOD PRESSURE: 105 MMHG | HEIGHT: 66 IN | TEMPERATURE: 98.4 F | BODY MASS INDEX: 21.69 KG/M2 | DIASTOLIC BLOOD PRESSURE: 72 MMHG | WEIGHT: 135 LBS | HEART RATE: 79 BPM | OXYGEN SATURATION: 95 %

## 2024-01-22 DIAGNOSIS — R05.9 COUGH IN ADULT: ICD-10-CM

## 2024-01-22 DIAGNOSIS — R05.9 COUGH IN ADULT: Primary | ICD-10-CM

## 2024-01-22 LAB
EXPIRATION DATE: NORMAL
FLUAV AG UPPER RESP QL IA.RAPID: NOT DETECTED
FLUBV AG UPPER RESP QL IA.RAPID: NOT DETECTED
INTERNAL CONTROL: NORMAL
Lab: NORMAL
SARS-COV-2 AG UPPER RESP QL IA.RAPID: NOT DETECTED

## 2024-01-22 PROCEDURE — 87428 SARSCOV & INF VIR A&B AG IA: CPT | Performed by: INTERNAL MEDICINE

## 2024-01-22 PROCEDURE — 71046 X-RAY EXAM CHEST 2 VIEWS: CPT

## 2024-01-22 PROCEDURE — 99213 OFFICE O/P EST LOW 20 MIN: CPT | Performed by: INTERNAL MEDICINE

## 2024-01-22 RX ORDER — LEVOFLOXACIN 500 MG/1
500 TABLET, FILM COATED ORAL DAILY
Qty: 5 TABLET | Refills: 0 | Status: SHIPPED | OUTPATIENT
Start: 2024-01-22

## 2024-01-22 RX ORDER — PREDNISONE 20 MG/1
20 TABLET ORAL DAILY
Qty: 3 TABLET | Refills: 0 | Status: SHIPPED | OUTPATIENT
Start: 2024-01-22 | End: 2024-01-25

## 2024-01-22 NOTE — PROGRESS NOTES
Subjective   Jeni Newby is a 58 y.o. female who presents for sick visit.  She states she has been sick for over 1 week with cough, fatigue, nasal congestion.  Her cough has been persistent and she has vomited or developed a headache subsequently.  She has tried Tessalon Perles and her Symbicort inhaler without relief.  She believes NyQuil cold and flu helps her sleep at night.  She denies sore throat, ear pain, fevers, chills.  She is particularly concerned because she has a vacation scheduled in the coming weeks.        The following portions of the patient's history were reviewed and updated as appropriate: allergies, current medications, past family history, past medical history, past social history, past surgical history, and problem list.    Review of Systems   Constitutional:  Negative for chills and fever.   HENT:  Positive for postnasal drip and rhinorrhea. Negative for ear pain and sore throat.    Eyes: Negative.    Respiratory:  Positive for cough, shortness of breath and wheezing.    Cardiovascular:  Negative for chest pain.   Gastrointestinal:  Negative for constipation and diarrhea.   Endocrine: Negative.    Genitourinary: Negative.    Musculoskeletal:  Negative for myalgias.   Allergic/Immunologic: Negative.    Neurological:  Negative for headaches.   Hematological: Negative.    Psychiatric/Behavioral: Negative.         Objective   Physical Exam  Constitutional:       General: She is not in acute distress.  HENT:      Head: Normocephalic and atraumatic.      Nose: Nose normal.      Mouth/Throat:      Mouth: Mucous membranes are moist.      Pharynx: Oropharynx is clear. No oropharyngeal exudate or posterior oropharyngeal erythema.   Eyes:      General: No scleral icterus.     Pupils: Pupils are equal, round, and reactive to light.   Cardiovascular:      Rate and Rhythm: Normal rate and regular rhythm.      Pulses: Normal pulses.      Heart sounds: Normal heart sounds. No murmur heard.     No  friction rub. No gallop.   Pulmonary:      Effort: Pulmonary effort is normal. No respiratory distress.      Breath sounds: Rhonchi present. No wheezing.   Abdominal:      General: There is no distension.      Palpations: Abdomen is soft.      Tenderness: There is no abdominal tenderness.   Musculoskeletal:         General: Normal range of motion.      Cervical back: Normal range of motion.   Skin:     General: Skin is warm.      Findings: No bruising.   Neurological:      General: No focal deficit present.      Mental Status: She is alert. Mental status is at baseline.      Motor: No weakness.      Gait: Gait normal.   Psychiatric:         Mood and Affect: Mood normal.         Behavior: Behavior normal.         Thought Content: Thought content normal.         Judgment: Judgment normal.         Assessment & Plan   Diagnoses and all orders for this visit:    1. Cough in adult (Primary)  -     POCT SARS-CoV-2 Antigen LYNDSAY + Flu, both negative  -     XR Chest 2 View; Future, negative  -No pneumonia present on chest x-ray, and viral testing negative,   -Empiric coverage with Levaquin 500 mg for 5 days and prednisone 20 mg for 3 days for suspected asthmatic bronchitis  - Over-the-counter cold and flu or cough medicines are okay for symptomatic control  -Maintain good fluid intake  -Patient is instructed to return if fevers develop or if symptoms continue after treatment       Yesenia Arroyo, OMS-IV   This note accurately reflects work and decisions made by me.

## 2024-02-01 ENCOUNTER — PATIENT MESSAGE (OUTPATIENT)
Dept: INTERNAL MEDICINE | Facility: CLINIC | Age: 59
End: 2024-02-01
Payer: COMMERCIAL

## 2024-02-27 ENCOUNTER — OFFICE VISIT (OUTPATIENT)
Dept: OBSTETRICS AND GYNECOLOGY | Facility: CLINIC | Age: 59
End: 2024-02-27
Payer: COMMERCIAL

## 2024-02-27 VITALS — DIASTOLIC BLOOD PRESSURE: 68 MMHG | WEIGHT: 137 LBS | SYSTOLIC BLOOD PRESSURE: 110 MMHG | BODY MASS INDEX: 22.12 KG/M2

## 2024-02-27 DIAGNOSIS — Z01.419 WOMEN'S ANNUAL ROUTINE GYNECOLOGICAL EXAMINATION: ICD-10-CM

## 2024-02-27 DIAGNOSIS — Z78.0 MENOPAUSE: Primary | ICD-10-CM

## 2024-02-27 DIAGNOSIS — R30.0 DYSURIA: ICD-10-CM

## 2024-02-27 LAB
BILIRUB BLD-MCNC: NEGATIVE MG/DL
CLARITY, POC: CLEAR
COLOR UR: YELLOW
GLUCOSE UR STRIP-MCNC: NEGATIVE MG/DL
KETONES UR QL: NEGATIVE
LEUKOCYTE EST, POC: NEGATIVE
NITRITE UR-MCNC: NEGATIVE MG/ML
PH UR: 6 [PH] (ref 5–8)
PROT UR STRIP-MCNC: NEGATIVE MG/DL
RBC # UR STRIP: NEGATIVE /UL
SP GR UR: 1.01 (ref 1–1.03)
UROBILINOGEN UR QL: NORMAL
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
## 2024-03-11 ENCOUNTER — OFFICE VISIT (OUTPATIENT)
Dept: INTERNAL MEDICINE | Facility: CLINIC | Age: 59
End: 2024-03-11
Payer: COMMERCIAL

## 2024-03-11 VITALS
TEMPERATURE: 98.4 F | DIASTOLIC BLOOD PRESSURE: 68 MMHG | BODY MASS INDEX: 21.38 KG/M2 | OXYGEN SATURATION: 98 % | RESPIRATION RATE: 16 BRPM | SYSTOLIC BLOOD PRESSURE: 94 MMHG | HEART RATE: 82 BPM | HEIGHT: 66 IN | WEIGHT: 133 LBS

## 2024-03-11 DIAGNOSIS — E86.1 HYPOTENSION DUE TO HYPOVOLEMIA: ICD-10-CM

## 2024-03-11 DIAGNOSIS — I95.89 HYPOTENSION DUE TO HYPOVOLEMIA: ICD-10-CM

## 2024-03-11 DIAGNOSIS — R10.11 RIGHT UPPER QUADRANT ABDOMINAL PAIN: Primary | ICD-10-CM

## 2024-03-11 NOTE — PROGRESS NOTES
"Subjective  Jeni Newby is a 59 y.o. female    HPI coming in for evaluation of a 3-day history of generalized weakness and fatigue had some dry heaves and a cough without associated fever or chills has had some weight loss.  Symptoms appear to have started after she substituted for  for 4 days prior to this episode.  Non-smoker denies significant alcohol use    The following portions of the patient's history were reviewed and updated as appropriate: allergies, current medications, past family history, past medical history, past social history, past surgical history, and problem list.     Review of Systems   Constitutional:  Positive for fatigue. Negative for activity change, appetite change and fever.   HENT:  Negative for congestion, ear discharge, ear pain and trouble swallowing.    Eyes:  Negative for photophobia and visual disturbance.   Respiratory:  Negative for cough and shortness of breath.    Cardiovascular:  Negative for chest pain and palpitations.   Gastrointestinal:  Positive for nausea and vomiting. Negative for abdominal distention, abdominal pain, constipation and diarrhea.   Endocrine: Negative.    Genitourinary:  Negative for dysuria, hematuria and urgency.   Musculoskeletal:  Negative for arthralgias, back pain, joint swelling and myalgias.   Skin:  Negative for color change and rash.   Allergic/Immunologic: Negative.    Neurological:  Negative for dizziness, weakness, light-headedness and headaches.   Hematological:  Negative for adenopathy. Does not bruise/bleed easily.   Psychiatric/Behavioral:  Negative for agitation, confusion and dysphoric mood. The patient is not nervous/anxious.        Visit Vitals  BP 94/68   Pulse 82   Temp 98.4 °F (36.9 °C)   Resp 16   Ht 167.6 cm (66\")   Wt 60.3 kg (133 lb)   LMP  (LMP Unknown) Comment: PATIENT REPORTS LMP 2-3 YEARS AGO. DENIES ANY HX OF ABLATION.   SpO2 98%   BMI 21.47 kg/m²       Objective  Physical Exam  Constitutional:  "      General: She is not in acute distress.     Appearance: She is well-developed.   HENT:      Nose: Nose normal.   Eyes:      General: No scleral icterus.     Conjunctiva/sclera: Conjunctivae normal.   Neck:      Thyroid: No thyromegaly.      Trachea: No tracheal deviation.   Cardiovascular:      Rate and Rhythm: Normal rate and regular rhythm.      Heart sounds: No murmur heard.     No friction rub.   Pulmonary:      Effort: No respiratory distress.      Breath sounds: No wheezing or rales.   Abdominal:      General: There is no distension.      Palpations: Abdomen is soft. There is no mass.      Tenderness: There is abdominal tenderness. There is guarding.   Musculoskeletal:         General: No deformity. Normal range of motion.   Lymphadenopathy:      Cervical: No cervical adenopathy.   Skin:     General: Skin is warm and dry.      Findings: No erythema or rash.   Neurological:      Mental Status: She is alert and oriented to person, place, and time.      Cranial Nerves: No cranial nerve deficit.      Coordination: Coordination normal.      Deep Tendon Reflexes: Reflexes are normal and symmetric.   Psychiatric:         Behavior: Behavior normal.         Thought Content: Thought content normal.         Judgment: Judgment normal.       Diagnoses and all orders for this visit:    Right upper quadrant abdominal pain with nausea had tenderness in her right upper quadrant region suspicious for gallbladder disease.  Check routine lab work and right upper quadrant ultrasound.  Currently afebrile holding off on antibiotic therapy discussed dietary restrictions avoid heavy greasy meals        BMI is within normal parameters. No other follow-up for BMI required.

## 2024-03-12 LAB
ALBUMIN SERPL-MCNC: 4.1 G/DL (ref 3.5–5.2)
ALBUMIN/GLOB SERPL: 1.5 G/DL
ALP SERPL-CCNC: 70 U/L (ref 39–117)
ALT SERPL-CCNC: 12 U/L (ref 1–33)
AST SERPL-CCNC: 23 U/L (ref 1–32)
BASOPHILS # BLD AUTO: 0.03 10*3/MM3 (ref 0–0.2)
BASOPHILS NFR BLD AUTO: 0.8 % (ref 0–1.5)
BILIRUB SERPL-MCNC: 0.4 MG/DL (ref 0–1.2)
BUN SERPL-MCNC: 10 MG/DL (ref 8–23)
BUN/CREAT SERPL: 13.7 (ref 7–25)
CALCIUM SERPL-MCNC: 9 MG/DL (ref 8.6–10.5)
CHLORIDE SERPL-SCNC: 104 MMOL/L (ref 98–107)
CO2 SERPL-SCNC: 27.7 MMOL/L (ref 22–29)
CREAT SERPL-MCNC: 0.73 MG/DL (ref 0.57–1)
EGFRCR SERPLBLD CKD-EPI 2021: 94.3 ML/MIN/1.73
EOSINOPHIL # BLD AUTO: 0.04 10*3/MM3 (ref 0–0.4)
EOSINOPHIL NFR BLD AUTO: 1.1 % (ref 0.3–6.2)
ERYTHROCYTE [DISTWIDTH] IN BLOOD BY AUTOMATED COUNT: 13 % (ref 12.3–15.4)
GLOBULIN SER CALC-MCNC: 2.8 GM/DL
GLUCOSE SERPL-MCNC: 86 MG/DL (ref 65–99)
HCT VFR BLD AUTO: 42.7 % (ref 34–46.6)
HGB BLD-MCNC: 14.2 G/DL (ref 12–15.9)
IMM GRANULOCYTES # BLD AUTO: 0.05 10*3/MM3 (ref 0–0.05)
IMM GRANULOCYTES NFR BLD AUTO: 1.3 % (ref 0–0.5)
LYMPHOCYTES # BLD AUTO: 1.17 10*3/MM3 (ref 0.7–3.1)
LYMPHOCYTES NFR BLD AUTO: 31 % (ref 19.6–45.3)
MCH RBC QN AUTO: 29.6 PG (ref 26.6–33)
MCHC RBC AUTO-ENTMCNC: 33.3 G/DL (ref 31.5–35.7)
MCV RBC AUTO: 89 FL (ref 79–97)
MONOCYTES # BLD AUTO: 0.32 10*3/MM3 (ref 0.1–0.9)
MONOCYTES NFR BLD AUTO: 8.5 % (ref 5–12)
NEUTROPHILS # BLD AUTO: 2.16 10*3/MM3 (ref 1.7–7)
NEUTROPHILS NFR BLD AUTO: 57.3 % (ref 42.7–76)
PLATELET # BLD AUTO: 107 10*3/MM3 (ref 140–450)
POTASSIUM SERPL-SCNC: 4.2 MMOL/L (ref 3.5–5.2)
PROT SERPL-MCNC: 6.9 G/DL (ref 6–8.5)
RBC # BLD AUTO: 4.8 10*6/MM3 (ref 3.77–5.28)
SODIUM SERPL-SCNC: 144 MMOL/L (ref 136–145)
WBC # BLD AUTO: 3.77 10*3/MM3 (ref 3.4–10.8)

## 2024-03-14 ENCOUNTER — TELEPHONE (OUTPATIENT)
Dept: INTERNAL MEDICINE | Facility: CLINIC | Age: 59
End: 2024-03-14
Payer: COMMERCIAL

## 2024-03-14 NOTE — TELEPHONE ENCOUNTER
YAAM with patient to notify that her US order has been faxed to [facility] and she can give them a call to schedule.

## 2024-03-14 NOTE — TELEPHONE ENCOUNTER
Jeni needs referral faxed to [facility] because they are able to get her in tomorrow as oppose to May with [employer]. Patient is leaving for [facility] next week and request that this be done for her. The fax number is [phone].     Also, please give Jeni a call whenever this has been faxed to [facility].

## 2024-04-03 ENCOUNTER — TRANSCRIBE ORDERS (OUTPATIENT)
Dept: OBSTETRICS AND GYNECOLOGY | Facility: CLINIC | Age: 59
End: 2024-04-03
Payer: COMMERCIAL

## 2024-04-03 DIAGNOSIS — Z12.31 VISIT FOR SCREENING MAMMOGRAM: Primary | ICD-10-CM

## 2024-05-06 ENCOUNTER — TELEPHONE (OUTPATIENT)
Dept: OBSTETRICS AND GYNECOLOGY | Facility: CLINIC | Age: 59
End: 2024-05-06
Payer: COMMERCIAL

## 2024-05-08 ENCOUNTER — HOSPITAL ENCOUNTER (OUTPATIENT)
Dept: MAMMOGRAPHY | Facility: HOSPITAL | Age: 59
Discharge: HOME | End: 2024-05-08
Admitting: OBSTETRICS & GYNECOLOGY
Payer: COMMERCIAL

## 2024-05-08 DIAGNOSIS — Z12.31 VISIT FOR SCREENING MAMMOGRAM: ICD-10-CM

## 2024-05-08 PROCEDURE — 77063 BREAST TOMOSYNTHESIS BI: CPT

## 2024-05-08 PROCEDURE — 77067 SCR MAMMO BI INCL CAD: CPT

## 2024-09-04 DIAGNOSIS — N64.4 BREAST PAIN: Primary | ICD-10-CM

## 2024-09-23 ENCOUNTER — OFFICE VISIT (OUTPATIENT)
Dept: GASTROENTEROLOGY | Facility: CLINIC | Age: 59
End: 2024-09-23
Payer: COMMERCIAL

## 2024-09-23 VITALS
BODY MASS INDEX: 21.63 KG/M2 | HEART RATE: 68 BPM | OXYGEN SATURATION: 99 % | SYSTOLIC BLOOD PRESSURE: 100 MMHG | DIASTOLIC BLOOD PRESSURE: 62 MMHG | WEIGHT: 134 LBS

## 2024-09-23 DIAGNOSIS — Z91.018 ALLERGY TO ALPHA-GAL: Primary | ICD-10-CM

## 2024-09-23 DIAGNOSIS — K58.2 IRRITABLE BOWEL SYNDROME WITH BOTH CONSTIPATION AND DIARRHEA: ICD-10-CM

## 2024-09-23 PROCEDURE — 99213 OFFICE O/P EST LOW 20 MIN: CPT | Performed by: INTERNAL MEDICINE

## 2024-09-27 ENCOUNTER — OFFICE VISIT (OUTPATIENT)
Dept: INTERNAL MEDICINE | Facility: CLINIC | Age: 59
End: 2024-09-27
Payer: COMMERCIAL

## 2024-09-27 VITALS
WEIGHT: 135 LBS | HEART RATE: 62 BPM | TEMPERATURE: 97.8 F | SYSTOLIC BLOOD PRESSURE: 101 MMHG | BODY MASS INDEX: 21.69 KG/M2 | HEIGHT: 66 IN | DIASTOLIC BLOOD PRESSURE: 71 MMHG | OXYGEN SATURATION: 97 %

## 2024-09-27 DIAGNOSIS — Z00.00 ROUTINE GENERAL MEDICAL EXAMINATION AT A HEALTH CARE FACILITY: ICD-10-CM

## 2024-09-27 DIAGNOSIS — Z23 NEED FOR INFLUENZA VACCINATION: Primary | ICD-10-CM

## 2024-09-27 PROCEDURE — 99396 PREV VISIT EST AGE 40-64: CPT | Performed by: INTERNAL MEDICINE

## 2024-09-27 PROCEDURE — 90656 IIV3 VACC NO PRSV 0.5 ML IM: CPT | Performed by: INTERNAL MEDICINE

## 2024-09-27 PROCEDURE — 90471 IMMUNIZATION ADMIN: CPT | Performed by: INTERNAL MEDICINE
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
## 2024-10-01 LAB
ALBUMIN SERPL-MCNC: 4.2 G/DL (ref 3.5–5.2)
ALBUMIN/GLOB SERPL: 1.8 G/DL
ALP SERPL-CCNC: 82 U/L (ref 39–117)
ALT SERPL-CCNC: 13 U/L (ref 1–33)
AST SERPL-CCNC: 18 U/L (ref 1–32)
BILIRUB SERPL-MCNC: 0.4 MG/DL (ref 0–1.2)
BUN SERPL-MCNC: 11 MG/DL (ref 8–23)
BUN/CREAT SERPL: 15.3 (ref 7–25)
CALCIUM SERPL-MCNC: 9.1 MG/DL (ref 8.6–10.5)
CHLORIDE SERPL-SCNC: 105 MMOL/L (ref 98–107)
CHOLEST SERPL-MCNC: 196 MG/DL (ref 0–200)
CO2 SERPL-SCNC: 27.4 MMOL/L (ref 22–29)
CREAT SERPL-MCNC: 0.72 MG/DL (ref 0.57–1)
EGFRCR SERPLBLD CKD-EPI 2021: 95.9 ML/MIN/1.73
ERYTHROCYTE [DISTWIDTH] IN BLOOD BY AUTOMATED COUNT: 13.1 % (ref 12.3–15.4)
GLOBULIN SER CALC-MCNC: 2.4 GM/DL
GLUCOSE SERPL-MCNC: 83 MG/DL (ref 65–99)
HCT VFR BLD AUTO: 41.8 % (ref 34–46.6)
HDLC SERPL-MCNC: 58 MG/DL (ref 40–60)
HGB BLD-MCNC: 13.6 G/DL (ref 12–15.9)
LDLC SERPL CALC-MCNC: 128 MG/DL (ref 0–100)
MCH RBC QN AUTO: 29.4 PG (ref 26.6–33)
MCHC RBC AUTO-ENTMCNC: 32.5 G/DL (ref 31.5–35.7)
MCV RBC AUTO: 90.3 FL (ref 79–97)
PLATELET # BLD AUTO: 108 10*3/MM3 (ref 140–450)
POTASSIUM SERPL-SCNC: 4.1 MMOL/L (ref 3.5–5.2)
PROT SERPL-MCNC: 6.6 G/DL (ref 6–8.5)
RBC # BLD AUTO: 4.63 10*6/MM3 (ref 3.77–5.28)
SODIUM SERPL-SCNC: 141 MMOL/L (ref 136–145)
TRIGL SERPL-MCNC: 56 MG/DL (ref 0–150)
VLDLC SERPL CALC-MCNC: 10 MG/DL (ref 5–40)
WBC # BLD AUTO: 3.81 10*3/MM3 (ref 3.4–10.8)

## 2025-02-28 ENCOUNTER — OFFICE VISIT (OUTPATIENT)
Dept: OBSTETRICS AND GYNECOLOGY | Facility: CLINIC | Age: 60
End: 2025-02-28
Payer: COMMERCIAL

## 2025-02-28 VITALS — WEIGHT: 137 LBS | BODY MASS INDEX: 22.11 KG/M2 | SYSTOLIC BLOOD PRESSURE: 100 MMHG | DIASTOLIC BLOOD PRESSURE: 60 MMHG

## 2025-02-28 DIAGNOSIS — T78.1XXA ALLERGIC REACTION TO ALPHA-GAL: ICD-10-CM

## 2025-02-28 DIAGNOSIS — Z01.419 WOMEN'S ANNUAL ROUTINE GYNECOLOGICAL EXAMINATION: Primary | ICD-10-CM

## 2025-02-28 DIAGNOSIS — Z12.4 SCREENING FOR CERVICAL CANCER: ICD-10-CM
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
## 2025-03-04 LAB — REF LAB TEST METHOD: NORMAL

## 2025-04-07 ENCOUNTER — TRANSCRIBE ORDERS (OUTPATIENT)
Dept: ADMINISTRATIVE | Facility: HOSPITAL | Age: 60
End: 2025-04-07
Payer: COMMERCIAL

## 2025-04-07 DIAGNOSIS — Z12.31 VISIT FOR SCREENING MAMMOGRAM: Primary | ICD-10-CM

## 2025-05-12 ENCOUNTER — TELEPHONE (OUTPATIENT)
Dept: INTERNAL MEDICINE | Facility: CLINIC | Age: 60
End: 2025-05-12
Payer: COMMERCIAL

## 2025-05-12 RX ORDER — SCOPOLAMINE 1 MG/3D
1 PATCH, EXTENDED RELEASE TRANSDERMAL
Qty: 3 EACH | Refills: 0 | Status: SHIPPED | OUTPATIENT
Start: 2025-05-12

## 2025-05-12 RX ORDER — AZITHROMYCIN 250 MG/1
TABLET, FILM COATED ORAL
Qty: 6 TABLET | Refills: 0 | Status: SHIPPED | OUTPATIENT
Start: 2025-05-12

## 2025-05-12 NOTE — TELEPHONE ENCOUNTER
Caller: [id number] - [place], [place] - [address] - [phone]  - [phone]    Relationship to patient: Pharmacy    Patient is needing:     Scopolamine 1 MG/3DAYS patch     ONLY HAS PACK SIZES OF 4, NEEDS PCPS PERMISSION.

## 2025-05-19 ENCOUNTER — OFFICE VISIT (OUTPATIENT)
Dept: INTERNAL MEDICINE | Facility: CLINIC | Age: 60
End: 2025-05-19
Payer: COMMERCIAL

## 2025-05-19 VITALS
BODY MASS INDEX: 21.69 KG/M2 | RESPIRATION RATE: 18 BRPM | HEART RATE: 91 BPM | WEIGHT: 135 LBS | SYSTOLIC BLOOD PRESSURE: 97 MMHG | HEIGHT: 66 IN | OXYGEN SATURATION: 99 % | TEMPERATURE: 99.6 F | DIASTOLIC BLOOD PRESSURE: 64 MMHG

## 2025-05-19 DIAGNOSIS — J06.9 UPPER RESPIRATORY INFECTION, VIRAL: ICD-10-CM

## 2025-05-19 DIAGNOSIS — J02.9 SORE THROAT: ICD-10-CM

## 2025-05-19 DIAGNOSIS — J02.9 PHARYNGITIS, UNSPECIFIED ETIOLOGY: Primary | ICD-10-CM

## 2025-05-19 DIAGNOSIS — H10.13 ALLERGIC CONJUNCTIVITIS OF BOTH EYES: ICD-10-CM

## 2025-05-19 LAB
EXPIRATION DATE: NORMAL
INTERNAL CONTROL: NORMAL
Lab: NORMAL
S PYO AG THROAT QL: NEGATIVE

## 2025-05-19 PROCEDURE — 87880 STREP A ASSAY W/OPTIC: CPT | Performed by: STUDENT IN AN ORGANIZED HEALTH CARE EDUCATION/TRAINING PROGRAM

## 2025-05-19 PROCEDURE — 99213 OFFICE O/P EST LOW 20 MIN: CPT | Performed by: STUDENT IN AN ORGANIZED HEALTH CARE EDUCATION/TRAINING PROGRAM

## 2025-05-19 RX ORDER — ALBUTEROL SULFATE 90 UG/1
2 INHALANT RESPIRATORY (INHALATION) EVERY 4 HOURS PRN
COMMUNITY
Start: 2025-05-03

## 2025-05-19 RX ORDER — GUAIFENESIN 600 MG/1
600 TABLET, EXTENDED RELEASE ORAL 2 TIMES DAILY
Qty: 20 TABLET | Refills: 2 | Status: SHIPPED | OUTPATIENT
Start: 2025-05-19

## 2025-05-19 RX ORDER — FLUTICASONE PROPIONATE 50 MCG
2 SPRAY, SUSPENSION (ML) NASAL DAILY
Qty: 11.1 G | Refills: 0 | Status: SHIPPED | OUTPATIENT
Start: 2025-05-19

## 2025-05-19 RX ORDER — TRIAMCINOLONE ACETONIDE 1 MG/G
CREAM TOPICAL
COMMUNITY
Start: 2025-03-04

## 2025-05-19 RX ORDER — OLOPATADINE HYDROCHLORIDE 1 MG/ML
1 SOLUTION/ DROPS OPHTHALMIC 2 TIMES DAILY
Qty: 5 ML | Refills: 0 | Status: SHIPPED | OUTPATIENT
Start: 2025-05-19 | End: 2025-05-29

## 2025-05-19 RX ORDER — MOMETASONE FUROATE 200 UG/1
2 AEROSOL RESPIRATORY (INHALATION) 2 TIMES DAILY
COMMUNITY
Start: 2025-05-14

## 2025-05-19 RX ORDER — BENZONATATE 100 MG/1
100 CAPSULE ORAL 2 TIMES DAILY PRN
Qty: 30 CAPSULE | Refills: 0 | Status: SHIPPED | OUTPATIENT
Start: 2025-05-19

## 2025-05-19 RX ORDER — AMOXICILLIN 500 MG/1
500 CAPSULE ORAL 3 TIMES DAILY
Qty: 21 CAPSULE | Refills: 0 | Status: SHIPPED | OUTPATIENT
Start: 2025-05-19 | End: 2025-05-26

## 2025-05-19 NOTE — PROGRESS NOTES
"    Office Note     Name: Jeni Newby    : 1965     MRN: [id number]     Chief Complaint  Eye Drainage (Poss pink eye) and Sore Throat (Started Friday afternoon)    Subjective     History of Present Illness:  Jeni Newby is a 60 y.o. female who presents today for right eye is pink and mild pink eye on left with occasional irritating feeling, soreness on the preauricular area, and sore throat x 3 days. Subjective fever. Did substitute at a  4 days ago.       Family History:   Family History   Problem Relation Age of Onset    Heart disease Mother     Ovarian cancer Maternal Grandmother 64    Breast cancer Maternal Aunt 86    Colon cancer Neg Hx     Uterine cancer Neg Hx        Social History:   Social History     Socioeconomic History    Marital status: Single   Tobacco Use    Smoking status: Never    Smokeless tobacco: Never   Vaping Use    Vaping status: Never Used   Substance and Sexual Activity    Alcohol use: No    Drug use: No    Sexual activity: Yes     Partners: Male     Birth control/protection: None       Health Maintenance   Topic Date Due    Pneumococcal Vaccine 50+ (2 of 2 - PCV) 10/18/2019    COVID-19 Vaccine (2024- season) 2026 (Originally 2024)    INFLUENZA VACCINE  2025    ANNUAL PHYSICAL  2025    PAP SMEAR  2026    MAMMOGRAM  2026    TDAP/TD VACCINES (2 - Td or Tdap) 2029    COLORECTAL CANCER SCREENING  2032    HEPATITIS C SCREENING  Completed    ZOSTER VACCINE  Completed       Patient Care Team:  Philipp Boogie MD as PCP - General (Internal Medicine)  Lidia Barker MD as Consulting Physician (Gastroenterology)    Objective     Vital Signs  BP 97/64   Pulse 91   Temp 99.6 °F (37.6 °C) (Infrared)   Resp 18   Ht 167.6 cm (65.98\")   Wt 61.2 kg (135 lb)   SpO2 99%   BMI 21.80 kg/m²   Estimated body mass index is 21.8 kg/m² as calculated from the following:    Height as of this encounter: 167.6 cm " "(65.98\").    Weight as of this encounter: 61.2 kg (135 lb).  BMI is within normal parameters. No other follow-up for BMI required.    Physical Exam  Vitals reviewed.   HENT:      Head: Normocephalic.      Right Ear: Tympanic membrane normal.      Left Ear: Tympanic membrane normal.      Ears:      Comments: Moderate ear effusion bilateral     Nose: Congestion present.      Mouth/Throat:      Pharynx: Posterior oropharyngeal erythema present. No oropharyngeal exudate.   Pulmonary:      Effort: Pulmonary effort is normal. No respiratory distress.   Neurological:      Mental Status: She is alert.          Procedures     Assessment and Plan     Diagnoses and all orders for this visit:    1. Pharyngitis, unspecified etiology (Primary)  -     amoxicillin (AMOXIL) 500 MG capsule; Take 1 capsule by mouth 3 (Three) Times a Day for 7 days.  Dispense: 21 capsule; Refill: 0    2. Sore throat  -     POCT rapid strep A    3. Upper respiratory infection, viral  -     guaiFENesin (Mucinex) 600 MG 12 hr tablet; Take 1 tablet by mouth 2 (Two) Times a Day.  Dispense: 20 tablet; Refill: 2  -     fluticasone (FLONASE) 50 MCG/ACT nasal spray; Administer 2 sprays into the nostril(s) as directed by provider Daily.  Dispense: 11.1 g; Refill: 0  -     benzonatate (Tessalon Perles) 100 MG capsule; Take 1 capsule by mouth 2 (Two) Times a Day As Needed for Cough.  Dispense: 30 capsule; Refill: 0  -     amoxicillin (AMOXIL) 500 MG capsule; Take 1 capsule by mouth 3 (Three) Times a Day for 7 days.  Dispense: 21 capsule; Refill: 0  -     olopatadine (PATANOL) 0.1 % ophthalmic solution; Administer 1 drop into the left eye 2 (Two) Times a Day for 10 days.  Dispense: 5 mL; Refill: 0    4. Allergic conjunctivitis of both eyes  -     olopatadine (PATANOL) 0.1 % ophthalmic solution; Administer 1 drop into the left eye 2 (Two) Times a Day for 10 days.  Dispense: 5 mL; Refill: 0     To help relieve symptoms of URI/Sinus congestion and cough   For cold, " sinus congestion, upper and lower respiratory congestion, OTC symptomatic management can include a mucolytic such as guaifenesin (Mucinx, Robitussin) with increased water to help keep mucous and drainage thin.  Dexomorphin, DM (Robitussin-DM)  a cough suppressant or expectorant may be of help during the night. May add an antihistamine (loratadine/cetirizine) nightly to reduce the inflammation triggers and helped with symptoms of clear runny nose, sneezing, watery eyes and postnasal drainage. Flonase (Corticosteriod) aimed to each nose is appropriate for nasal congestion, ear fullness and popping unrelated to an infection.  Tylenol/Motrin for fever and pain as appropriate and dosed per package. Humidified air, rest, and increase fluids as tolerated to help body fight infection. Discuss with pharmacist any dosing or appropriate medication choices.  Read all OTC medication labels carefully.   Home remedies consist of rest, warm soaks to sinus and face, breathing warm steam (avoiding burns) 1 tsp of eucalyptus oil may be added to the water to help with congestion.  you may take 1 tablespoon of honey daily for cough. Increase water intake and avoid dehydration.   Informed patient of warning signs and red flag symptoms which include fever greater than 100.4, difficulty breathing and oxygen saturation less than 90%.      Counseling was given to patient for the following topics: instructions for management, risks and benefits of treatment options, and importance of treatment compliance.    Follow Up  No follow-ups on file.    MD JOSIAH Acuña [facility]  [address] [place] [address] [address]  [facility] [id number] [phone]

## 2025-06-26 ENCOUNTER — HOSPITAL ENCOUNTER (OUTPATIENT)
Dept: MAMMOGRAPHY | Facility: HOSPITAL | Age: 60
Discharge: HOME | End: 2025-06-26
Admitting: OBSTETRICS & GYNECOLOGY
Payer: COMMERCIAL

## 2025-06-26 DIAGNOSIS — Z12.31 VISIT FOR SCREENING MAMMOGRAM: ICD-10-CM

## 2025-06-26 PROCEDURE — 77063 BREAST TOMOSYNTHESIS BI: CPT

## 2025-06-26 PROCEDURE — 77067 SCR MAMMO BI INCL CAD: CPT

## 2025-07-22 ENCOUNTER — OFFICE VISIT (OUTPATIENT)
Dept: INTERNAL MEDICINE | Facility: CLINIC | Age: 60
End: 2025-07-22
Payer: COMMERCIAL

## 2025-07-22 VITALS
OXYGEN SATURATION: 99 % | BODY MASS INDEX: 21.86 KG/M2 | WEIGHT: 136 LBS | HEART RATE: 68 BPM | DIASTOLIC BLOOD PRESSURE: 62 MMHG | HEIGHT: 66 IN | SYSTOLIC BLOOD PRESSURE: 112 MMHG | TEMPERATURE: 98 F

## 2025-07-22 DIAGNOSIS — T14.8XXA BRUISE: ICD-10-CM

## 2025-07-22 DIAGNOSIS — M79.604 RIGHT LEG PAIN: Primary | ICD-10-CM

## 2025-07-22 PROCEDURE — 99213 OFFICE O/P EST LOW 20 MIN: CPT | Performed by: FAMILY MEDICINE

## 2025-07-22 NOTE — PROGRESS NOTES
Jeni Newby is a 60 y.o. female.    Chief Complaint   Patient presents with    Leg Pain     Bruise on leg causing pain, right back side of calf.        HPI   History of Present Illness  Patient reports painful bruise on right leg of unknown injury that has been there several days. Bruise is tender, fading to yellow, and associated with pain extending up the back of the thigh during walking. The pain up the leg began this morning.  Played golf without discomfort but experienced throbbing behind knee after sitting in car.  No treatments applied; has Arnicare at home but unused. Cleaning house yesterday, which she feels may have exacerbated symptoms.    The following portions of the patient's history were reviewed and updated as appropriate: allergies, current medications, past family history, past medical history, past social history, past surgical history and problem list.     Allergies   Allergen Reactions    Alpha-Gal Hives    Mushroom Extract Complex (Obsolete) Swelling     Throat swelling    Corn-Containing Products GI Intolerance     Cramps and diarrhea         Current Outpatient Medications:     albuterol sulfate  (90 Base) MCG/ACT inhaler, Inhale 2 puffs Every 4 (Four) Hours As Needed for Wheezing or Shortness of Air. Inhale 2 puffs by mouth every 4 to 6 hours as needed, Disp: , Rfl:     Asmanex  MCG/ACT aerosol, Inhale 2 puffs 2 (Two) Times a Day., Disp: , Rfl:     Auvi-Q 0.3 MG/0.3ML solution auto-injector injection, INJECT AS NEEDED FOR SEVERE ALLERGIC REACTION INCLUDING ANAPHYLAXIS AS DIRECTED, Disp: , Rfl:     B Complex Vitamins (B COMPLEX 1 PO), OTC unsure of dose, Disp: , Rfl:     calcium carbonate, oyster shell, 500 MG tablet tablet, Take 1 tablet by mouth 2 (Two) Times a Day., Disp: , Rfl:     EPINEPHrine (EpiPen 2-Gen) 0.3 MG/0.3ML solution auto-injector injection, Inject syring as directed for allergic reaction, Disp: 2 each, Rfl: 3    fluticasone (FLONASE) 50 MCG/ACT nasal  "spray, Administer 2 sprays into the nostril(s) as directed by provider Daily., Disp: 11.1 g, Rfl: 0    Multiple Vitamins-Minerals (MULTIVITAMIN ADULT PO), Take 1 tablet by mouth Daily., Disp: , Rfl:     Probiotic Product (PROBIOTIC-10 PO), , Disp: , Rfl:     Scopolamine 1 MG/3DAYS patch, Place 1 patch on the skin as directed by provider Every 72 (Seventy-Two) Hours., Disp: 3 each, Rfl: 0    triamcinolone (KENALOG) 0.1 % cream, Apply small amount topically to eczema on body twice a day for 2 weeks per month. Avoid face groin axillas, Disp: , Rfl:     benzonatate (Tessalon Perles) 100 MG capsule, Take 1 capsule by mouth 2 (Two) Times a Day As Needed for Cough. (Patient not taking: Reported on 7/22/2025), Disp: 30 capsule, Rfl: 0    guaiFENesin (Mucinex) 600 MG 12 hr tablet, Take 1 tablet by mouth 2 (Two) Times a Day. (Patient not taking: Reported on 7/22/2025), Disp: 20 tablet, Rfl: 2    ROS    Review of Systems   Constitutional:  Negative for chills and fever.   Musculoskeletal:         Right leg pain   Skin:  Positive for bruise.       Vitals:    07/22/25 1428   BP: 112/62   Pulse: 68   Temp: 98 °F (36.7 °C)   SpO2: 99%   Weight: 61.7 kg (136 lb)   Height: 167.6 cm (65.98\")   PainSc: 2          Physical Exam     Physical Exam  Constitutional:       General: She is not in acute distress.     Appearance: Normal appearance. She is well-developed.   HENT:      Head: Normocephalic and atraumatic.      Right Ear: External ear normal.      Left Ear: External ear normal.   Eyes:      Extraocular Movements: Extraocular movements intact.      Conjunctiva/sclera: Conjunctivae normal.   Cardiovascular:      Rate and Rhythm: Normal rate.   Pulmonary:      Effort: Pulmonary effort is normal. No respiratory distress.   Abdominal:      General: There is no distension.   Musculoskeletal:      Right lower leg: No edema.      Left lower leg: No edema.   Skin:     General: Skin is warm and dry.      Findings: Bruising (large bruise " noted to right calf with centralized soft tissue mass with tenderness to the area.  Tenderness extends caudad.) present.   Neurological:      Mental Status: She is alert and oriented to person, place, and time.      Cranial Nerves: No cranial nerve deficit.   Psychiatric:         Mood and Affect: Mood normal.         Behavior: Behavior normal.           Diagnoses and all orders for this visit:    1. Right leg pain (Primary)  -     US venous doppler lower extremity right (duplex)    2. Bruise  -     US venous doppler lower extremity right (duplex)        Assessment & Plan  1. Right lower extremity contusion.  - Likely due to unknown injury, possibly related to house flipping activities.  - Stat ultrasound to rule out DVT; if negative, diagnosis is soft tissue swelling and bruising secondary to contusion of the tissues. Recommend Arnicare, ice application, and leg elevation.    No orders of the defined types were placed in this encounter.      No orders of the defined types were placed in this encounter.      Return if symptoms worsen or fail to improve.    Lyndsay Hinton, DO

## 2025-07-23 ENCOUNTER — HOSPITAL ENCOUNTER (OUTPATIENT)
Dept: ULTRASOUND IMAGING | Facility: HOSPITAL | Age: 60
Discharge: HOME | End: 2025-07-23
Admitting: FAMILY MEDICINE
Payer: COMMERCIAL

## 2025-07-23 PROCEDURE — 93971 EXTREMITY STUDY: CPT

## (undated) DEVICE — VLV SXN AIR/H2O ORCAPOD3 1P/U STRL

## (undated) DEVICE — LUBE JELLY PK/2.75GM STRL BX/144

## (undated) DEVICE — FRCP BIOP COLD ENDOJAW ALLGTR W/NDL 2.8X2300MM BLU

## (undated) DEVICE — ENDOSCOPY PORT CONNECTOR FOR OLYMPUS® SCOPES: Brand: ERBE

## (undated) DEVICE — PAD GRND REM POLYHESIVE A/ DISP

## (undated) DEVICE — SYR PREFIL W/SALINE FLSH 10ML

## (undated) DEVICE — Device

## (undated) DEVICE — ENDOGATOR AUXILIARY WATER JET CONNECTOR: Brand: ENDOGATOR

## (undated) DEVICE — HYBRID TUBING/CAP SET FOR OLYMPUS® SCOPES: Brand: ERBE

## (undated) DEVICE — JELLY,LUBE,STERILE,FLIP TOP,TUBE,2-OZ: Brand: MEDLINE